# Patient Record
Sex: MALE | Race: BLACK OR AFRICAN AMERICAN | NOT HISPANIC OR LATINO | Employment: PART TIME | URBAN - METROPOLITAN AREA
[De-identification: names, ages, dates, MRNs, and addresses within clinical notes are randomized per-mention and may not be internally consistent; named-entity substitution may affect disease eponyms.]

---

## 2019-12-10 ENCOUNTER — APPOINTMENT (EMERGENCY)
Dept: CT IMAGING | Facility: HOSPITAL | Age: 59
DRG: 299 | End: 2019-12-10
Payer: COMMERCIAL

## 2019-12-10 ENCOUNTER — APPOINTMENT (INPATIENT)
Dept: ULTRASOUND IMAGING | Facility: HOSPITAL | Age: 59
DRG: 299 | End: 2019-12-10
Payer: COMMERCIAL

## 2019-12-10 ENCOUNTER — HOSPITAL ENCOUNTER (INPATIENT)
Facility: HOSPITAL | Age: 59
LOS: 3 days | Discharge: HOME | DRG: 299 | End: 2019-12-13
Attending: EMERGENCY MEDICINE | Admitting: INTERNAL MEDICINE
Payer: COMMERCIAL

## 2019-12-10 DIAGNOSIS — I26.99 OTHER ACUTE PULMONARY EMBOLISM WITHOUT ACUTE COR PULMONALE (HCC): ICD-10-CM

## 2019-12-10 DIAGNOSIS — N17.9 ACUTE KIDNEY INJURY (HCC): ICD-10-CM

## 2019-12-10 DIAGNOSIS — K63.1 PERFORATED SIGMOID COLON (HCC): ICD-10-CM

## 2019-12-10 DIAGNOSIS — I26.99 PULMONARY EMBOLISM (HCC): Primary | ICD-10-CM

## 2019-12-10 DIAGNOSIS — E11.10 DKA (DIABETIC KETOACIDOSES): ICD-10-CM

## 2019-12-10 DIAGNOSIS — E11.10 DIABETIC KETOACIDOSIS WITHOUT COMA ASSOCIATED WITH TYPE 2 DIABETES MELLITUS (HCC): ICD-10-CM

## 2019-12-10 DIAGNOSIS — I82.409 ACUTE DEEP VEIN THROMBOSIS (DVT) OF LOWER EXTREMITY (HCC): ICD-10-CM

## 2019-12-10 PROBLEM — E88.09 HYPOALBUMINEMIA DUE TO PROTEIN-CALORIE MALNUTRITION (HCC): Status: ACTIVE | Noted: 2019-12-10

## 2019-12-10 PROBLEM — E87.2 LACTIC ACIDOSIS: Status: ACTIVE | Noted: 2019-12-10

## 2019-12-10 PROBLEM — R06.89 ACUTE RESPIRATORY INSUFFICIENCY: Status: ACTIVE | Noted: 2019-12-10

## 2019-12-10 PROBLEM — D72.825 BANDEMIA: Status: ACTIVE | Noted: 2019-12-10

## 2019-12-10 PROBLEM — E87.29 INCREASED ANION GAP METABOLIC ACIDOSIS: Status: ACTIVE | Noted: 2019-12-10

## 2019-12-10 PROBLEM — A41.9 SEPSIS (HCC): Status: ACTIVE | Noted: 2019-12-10

## 2019-12-10 PROBLEM — E87.20 LACTIC ACIDOSIS: Status: ACTIVE | Noted: 2019-12-10

## 2019-12-10 PROBLEM — E46 HYPOALBUMINEMIA DUE TO PROTEIN-CALORIE MALNUTRITION (HCC): Status: ACTIVE | Noted: 2019-12-10

## 2019-12-10 PROBLEM — E87.2 INCREASED ANION GAP METABOLIC ACIDOSIS: Status: ACTIVE | Noted: 2019-12-10

## 2019-12-10 LAB
ALBUMIN SERPL BCP-MCNC: 1.9 G/DL (ref 3.5–5)
ALP SERPL-CCNC: 188 U/L (ref 46–116)
ALT SERPL W P-5'-P-CCNC: 20 U/L (ref 12–78)
ANION GAP SERPL CALCULATED.3IONS-SCNC: 24 MMOL/L (ref 4–13)
ANION GAP SERPL CALCULATED.3IONS-SCNC: 26 MMOL/L (ref 4–13)
APTT PPP: 24 SECONDS (ref 23–37)
AST SERPL W P-5'-P-CCNC: 17 U/L (ref 5–45)
BACTERIA UR QL AUTO: ABNORMAL /HPF
BASE EX.OXY STD BLDV CALC-SCNC: 53.4 % (ref 60–80)
BASE EXCESS BLDV CALC-SCNC: -13 MMOL/L
BASOPHILS # BLD MANUAL: 0 THOUSAND/UL (ref 0–0.1)
BASOPHILS NFR MAR MANUAL: 0 % (ref 0–1)
BETA-HYDROXYBUTYRATE: 6.2 MMOL/L
BILIRUB SERPL-MCNC: 0.79 MG/DL (ref 0.2–1)
BILIRUB UR QL STRIP: NEGATIVE
BUN SERPL-MCNC: 19 MG/DL (ref 5–25)
BUN SERPL-MCNC: 21 MG/DL (ref 5–25)
CALCIUM SERPL-MCNC: 8.4 MG/DL (ref 8.3–10.1)
CALCIUM SERPL-MCNC: 9.2 MG/DL (ref 8.3–10.1)
CHLORIDE SERPL-SCNC: 89 MMOL/L (ref 100–108)
CHLORIDE SERPL-SCNC: 97 MMOL/L (ref 100–108)
CLARITY UR: CLEAR
CO2 SERPL-SCNC: 10 MMOL/L (ref 21–32)
CO2 SERPL-SCNC: 12 MMOL/L (ref 21–32)
COLOR UR: YELLOW
CREAT SERPL-MCNC: 1.29 MG/DL (ref 0.6–1.3)
CREAT SERPL-MCNC: 1.44 MG/DL (ref 0.6–1.3)
EOSINOPHIL # BLD MANUAL: 0.12 THOUSAND/UL (ref 0–0.4)
EOSINOPHIL NFR BLD MANUAL: 1 % (ref 0–6)
ERYTHROCYTE [DISTWIDTH] IN BLOOD BY AUTOMATED COUNT: 16.5 % (ref 11.6–15.1)
GFR SERPL CREATININE-BSD FRML MDRD: 62 ML/MIN/1.73SQ M
GFR SERPL CREATININE-BSD FRML MDRD: 71 ML/MIN/1.73SQ M
GLUCOSE SERPL-MCNC: 317 MG/DL (ref 65–140)
GLUCOSE SERPL-MCNC: 429 MG/DL (ref 65–140)
GLUCOSE UR STRIP-MCNC: ABNORMAL MG/DL
HCO3 BLDV-SCNC: 11.9 MMOL/L (ref 24–30)
HCT VFR BLD AUTO: 36.2 % (ref 36.5–49.3)
HGB BLD-MCNC: 11.5 G/DL (ref 12–17)
HGB UR QL STRIP.AUTO: ABNORMAL
INR PPP: 1.29 (ref 0.84–1.19)
KETONES UR STRIP-MCNC: ABNORMAL MG/DL
LACTATE SERPL-SCNC: 2.2 MMOL/L (ref 0.5–2)
LEUKOCYTE ESTERASE UR QL STRIP: NEGATIVE
LYMPHOCYTES # BLD AUTO: 1.16 THOUSAND/UL (ref 0.6–4.47)
LYMPHOCYTES # BLD AUTO: 10 % (ref 14–44)
MAGNESIUM SERPL-MCNC: 2.1 MG/DL (ref 1.6–2.6)
MCH RBC QN AUTO: 25.6 PG (ref 26.8–34.3)
MCHC RBC AUTO-ENTMCNC: 31.8 G/DL (ref 31.4–37.4)
MCV RBC AUTO: 81 FL (ref 82–98)
MONOCYTES # BLD AUTO: 1.04 THOUSAND/UL (ref 0–1.22)
MONOCYTES NFR BLD: 9 % (ref 4–12)
NEUTROPHILS # BLD MANUAL: 9.29 THOUSAND/UL (ref 1.85–7.62)
NEUTS BAND NFR BLD MANUAL: 10 % (ref 0–8)
NEUTS SEG NFR BLD AUTO: 70 % (ref 43–75)
NITRITE UR QL STRIP: NEGATIVE
NON-SQ EPI CELLS URNS QL MICRO: ABNORMAL /HPF
NRBC BLD AUTO-RTO: 0 /100 WBCS
NT-PROBNP SERPL-MCNC: 143 PG/ML
NT-PROBNP SERPL-MCNC: 148 PG/ML
O2 CT BLDV-SCNC: 8.9 ML/DL
PCO2 BLDV: 25.4 MM HG (ref 42–50)
PH BLDV: 7.29 [PH] (ref 7.3–7.4)
PH UR STRIP.AUTO: 5 [PH] (ref 4.5–8)
PHOSPHATE SERPL-MCNC: 2.1 MG/DL (ref 2.7–4.5)
PLATELET # BLD AUTO: 286 THOUSANDS/UL (ref 149–390)
PLATELET BLD QL SMEAR: ADEQUATE
PMV BLD AUTO: 10 FL (ref 8.9–12.7)
PO2 BLDV: 30.1 MM HG (ref 35–45)
POTASSIUM SERPL-SCNC: 4.1 MMOL/L (ref 3.5–5.3)
POTASSIUM SERPL-SCNC: 5 MMOL/L (ref 3.5–5.3)
PROT SERPL-MCNC: 8.3 G/DL (ref 6.4–8.2)
PROT UR STRIP-MCNC: ABNORMAL MG/DL
PROTHROMBIN TIME: 15.4 SECONDS (ref 11.6–14.5)
RBC # BLD AUTO: 4.49 MILLION/UL (ref 3.88–5.62)
RBC #/AREA URNS AUTO: ABNORMAL /HPF
RBC MORPH BLD: NORMAL
SODIUM SERPL-SCNC: 127 MMOL/L (ref 136–145)
SODIUM SERPL-SCNC: 131 MMOL/L (ref 136–145)
SP GR UR STRIP.AUTO: 1.01 (ref 1–1.03)
TOTAL CELLS COUNTED SPEC: 100
TROPONIN I SERPL-MCNC: <0.02 NG/ML
TROPONIN I SERPL-MCNC: <0.02 NG/ML
TSH SERPL DL<=0.05 MIU/L-ACNC: 1.17 UIU/ML (ref 0.36–3.74)
UROBILINOGEN UR QL STRIP.AUTO: 0.2 E.U./DL
WBC # BLD AUTO: 11.61 THOUSAND/UL (ref 4.31–10.16)
WBC #/AREA URNS AUTO: ABNORMAL /HPF

## 2019-12-10 PROCEDURE — 74177 CT ABD & PELVIS W/CONTRAST: CPT

## 2019-12-10 PROCEDURE — 71275 CT ANGIOGRAPHY CHEST: CPT

## 2019-12-10 PROCEDURE — 85027 COMPLETE CBC AUTOMATED: CPT | Performed by: EMERGENCY MEDICINE

## 2019-12-10 PROCEDURE — 99291 CRITICAL CARE FIRST HOUR: CPT | Performed by: PHYSICIAN ASSISTANT

## 2019-12-10 PROCEDURE — 96361 HYDRATE IV INFUSION ADD-ON: CPT

## 2019-12-10 PROCEDURE — 80053 COMPREHEN METABOLIC PANEL: CPT | Performed by: EMERGENCY MEDICINE

## 2019-12-10 PROCEDURE — 93970 EXTREMITY STUDY: CPT

## 2019-12-10 PROCEDURE — 87040 BLOOD CULTURE FOR BACTERIA: CPT | Performed by: EMERGENCY MEDICINE

## 2019-12-10 PROCEDURE — 84484 ASSAY OF TROPONIN QUANT: CPT | Performed by: EMERGENCY MEDICINE

## 2019-12-10 PROCEDURE — 99285 EMERGENCY DEPT VISIT HI MDM: CPT | Performed by: EMERGENCY MEDICINE

## 2019-12-10 PROCEDURE — 85610 PROTHROMBIN TIME: CPT | Performed by: EMERGENCY MEDICINE

## 2019-12-10 PROCEDURE — 84484 ASSAY OF TROPONIN QUANT: CPT | Performed by: PHYSICIAN ASSISTANT

## 2019-12-10 PROCEDURE — 99285 EMERGENCY DEPT VISIT HI MDM: CPT

## 2019-12-10 PROCEDURE — 83880 ASSAY OF NATRIURETIC PEPTIDE: CPT | Performed by: INTERNAL MEDICINE

## 2019-12-10 PROCEDURE — 83605 ASSAY OF LACTIC ACID: CPT | Performed by: EMERGENCY MEDICINE

## 2019-12-10 PROCEDURE — 83735 ASSAY OF MAGNESIUM: CPT | Performed by: PHYSICIAN ASSISTANT

## 2019-12-10 PROCEDURE — 80048 BASIC METABOLIC PNL TOTAL CA: CPT | Performed by: PHYSICIAN ASSISTANT

## 2019-12-10 PROCEDURE — 84443 ASSAY THYROID STIM HORMONE: CPT | Performed by: EMERGENCY MEDICINE

## 2019-12-10 PROCEDURE — 96365 THER/PROPH/DIAG IV INF INIT: CPT

## 2019-12-10 PROCEDURE — 85007 BL SMEAR W/DIFF WBC COUNT: CPT | Performed by: EMERGENCY MEDICINE

## 2019-12-10 PROCEDURE — 82010 KETONE BODYS QUAN: CPT | Performed by: EMERGENCY MEDICINE

## 2019-12-10 PROCEDURE — 84100 ASSAY OF PHOSPHORUS: CPT | Performed by: PHYSICIAN ASSISTANT

## 2019-12-10 PROCEDURE — 83880 ASSAY OF NATRIURETIC PEPTIDE: CPT | Performed by: EMERGENCY MEDICINE

## 2019-12-10 PROCEDURE — 93005 ELECTROCARDIOGRAM TRACING: CPT

## 2019-12-10 PROCEDURE — 81001 URINALYSIS AUTO W/SCOPE: CPT

## 2019-12-10 PROCEDURE — 96374 THER/PROPH/DIAG INJ IV PUSH: CPT

## 2019-12-10 PROCEDURE — 82805 BLOOD GASES W/O2 SATURATION: CPT | Performed by: EMERGENCY MEDICINE

## 2019-12-10 PROCEDURE — 36415 COLL VENOUS BLD VENIPUNCTURE: CPT | Performed by: EMERGENCY MEDICINE

## 2019-12-10 PROCEDURE — 85730 THROMBOPLASTIN TIME PARTIAL: CPT | Performed by: EMERGENCY MEDICINE

## 2019-12-10 PROCEDURE — 82948 REAGENT STRIP/BLOOD GLUCOSE: CPT

## 2019-12-10 RX ORDER — PANTOPRAZOLE SODIUM 40 MG/1
40 TABLET, DELAYED RELEASE ORAL
Status: DISCONTINUED | OUTPATIENT
Start: 2019-12-11 | End: 2019-12-13 | Stop reason: HOSPADM

## 2019-12-10 RX ORDER — DEXTROSE AND SODIUM CHLORIDE 5; .9 G/100ML; G/100ML
250 INJECTION, SOLUTION INTRAVENOUS CONTINUOUS
Status: DISPENSED | OUTPATIENT
Start: 2019-12-10 | End: 2019-12-12

## 2019-12-10 RX ORDER — SODIUM CHLORIDE 9 MG/ML
250 INJECTION, SOLUTION INTRAVENOUS CONTINUOUS
Status: DISCONTINUED | OUTPATIENT
Start: 2019-12-10 | End: 2019-12-10

## 2019-12-10 RX ORDER — SODIUM CHLORIDE 9 MG/ML
500 INJECTION, SOLUTION INTRAVENOUS CONTINUOUS
Status: DISPENSED | OUTPATIENT
Start: 2019-12-11 | End: 2019-12-11

## 2019-12-10 RX ORDER — ATORVASTATIN CALCIUM 10 MG/1
10 TABLET, FILM COATED ORAL DAILY
Status: DISCONTINUED | OUTPATIENT
Start: 2019-12-11 | End: 2019-12-13 | Stop reason: HOSPADM

## 2019-12-10 RX ORDER — ATORVASTATIN CALCIUM 10 MG/1
10 TABLET, FILM COATED ORAL DAILY
COMMUNITY

## 2019-12-10 RX ORDER — CHLORHEXIDINE GLUCONATE 0.12 MG/ML
15 RINSE ORAL EVERY 12 HOURS SCHEDULED
Status: DISCONTINUED | OUTPATIENT
Start: 2019-12-10 | End: 2019-12-11

## 2019-12-10 RX ORDER — HEPARIN SODIUM 10000 [USP'U]/100ML
3-30 INJECTION, SOLUTION INTRAVENOUS
Status: DISCONTINUED | OUTPATIENT
Start: 2019-12-10 | End: 2019-12-13

## 2019-12-10 RX ORDER — HEPARIN SODIUM 1000 [USP'U]/ML
6400 INJECTION, SOLUTION INTRAVENOUS; SUBCUTANEOUS AS NEEDED
Status: DISCONTINUED | OUTPATIENT
Start: 2019-12-10 | End: 2019-12-13

## 2019-12-10 RX ORDER — HEPARIN SODIUM 1000 [USP'U]/ML
3200 INJECTION, SOLUTION INTRAVENOUS; SUBCUTANEOUS AS NEEDED
Status: DISCONTINUED | OUTPATIENT
Start: 2019-12-10 | End: 2019-12-13

## 2019-12-10 RX ORDER — SODIUM CHLORIDE 9 MG/ML
1000 INJECTION, SOLUTION INTRAVENOUS CONTINUOUS
Status: DISPENSED | OUTPATIENT
Start: 2019-12-10 | End: 2019-12-10

## 2019-12-10 RX ORDER — CEFAZOLIN SODIUM 2 G/50ML
2000 SOLUTION INTRAVENOUS EVERY 8 HOURS
Status: DISCONTINUED | OUTPATIENT
Start: 2019-12-11 | End: 2019-12-13 | Stop reason: HOSPADM

## 2019-12-10 RX ORDER — HEPARIN SODIUM 1000 [USP'U]/ML
6400 INJECTION, SOLUTION INTRAVENOUS; SUBCUTANEOUS ONCE
Status: COMPLETED | OUTPATIENT
Start: 2019-12-10 | End: 2019-12-10

## 2019-12-10 RX ORDER — SODIUM CHLORIDE 9 MG/ML
250 INJECTION, SOLUTION INTRAVENOUS CONTINUOUS
Status: DISPENSED | OUTPATIENT
Start: 2019-12-11 | End: 2019-12-11

## 2019-12-10 RX ADMIN — SODIUM CHLORIDE 250 ML/HR: 0.9 INJECTION, SOLUTION INTRAVENOUS at 20:54

## 2019-12-10 RX ADMIN — SODIUM CHLORIDE 1000 ML: 0.9 INJECTION, SOLUTION INTRAVENOUS at 18:51

## 2019-12-10 RX ADMIN — SODIUM CHLORIDE 7.8 UNITS/HR: 9 INJECTION, SOLUTION INTRAVENOUS at 21:54

## 2019-12-10 RX ADMIN — IOHEXOL 100 ML: 350 INJECTION, SOLUTION INTRAVENOUS at 19:30

## 2019-12-10 RX ADMIN — SODIUM CHLORIDE 500 ML/HR: 0.9 INJECTION, SOLUTION INTRAVENOUS at 23:40

## 2019-12-10 RX ADMIN — METRONIDAZOLE 500 MG: 500 INJECTION, SOLUTION INTRAVENOUS at 23:09

## 2019-12-10 RX ADMIN — CEFEPIME HYDROCHLORIDE 2000 MG: 2 INJECTION, POWDER, FOR SOLUTION INTRAVENOUS at 20:50

## 2019-12-10 RX ADMIN — HEPARIN SODIUM 6400 UNITS: 1000 INJECTION, SOLUTION INTRAVENOUS; SUBCUTANEOUS at 21:26

## 2019-12-10 RX ADMIN — HEPARIN SODIUM AND DEXTROSE 18 UNITS/KG/HR: 10000; 5 INJECTION INTRAVENOUS at 21:28

## 2019-12-10 NOTE — ED PROVIDER NOTES
History  Chief Complaint   Patient presents with    Weakness - Generalized     L leg weakness  Pain in L calf  Possible DVT  Denies hx of DVT's       61-year-old male presents emergency department for evaluation of left leg pain and dyspnea on exertion  Patient states that he is approximately 5 weeks status post bowel resection for a cancerous polyp  The surgeries performed at [facility] by Dr Toño Keller  He was admitted to the hospital for 4 days and went home without complication  Patient states approximately 2 weeks ago he noticed that his right leg was painful and swollen  He was evaluated by his PCP who sent him for outpatient lab work  His lab work was abnormal and he was directed to the emergency department for further treatment  He states that he was evaluated at [facility] Emergency Department but discharged home  Since that time he has noticed increased dyspnea on exertion  He is now experiencing left lower leg pain mostly in the calf  He states that he is overall feeling weak  He denies chest pain  He has not had cough he states that his shortness of breath significantly improved when he is resting  He does feel at times that his heart is racing and this makes him feel anxious  He mostly notes this when he is exerting himself  Patient denies history of DVT or PE  Patient is diabetic  When I enter the room I can detect the scented acetone  He states that his insulin regimen was recently decreased by his PCP  He has not been checking his blood sugars regularly  Patient has been having diarrhea  He denies urinary frequency or dysuria  Patient states that he has no abdominal pain  He is right lower quadrant incision is healing well        History provided by:  Patient  History limited by:  Acuity of condition   used: No    Shortness of Breath   Severity:  Severe  Onset quality:  Gradual  Duration:  2 weeks  Timing:  Constant  Progression: Worsening  Chronicity:  New  Context: activity    Context: not URI    Relieved by:  Rest  Worsened by: Activity, exertion and movement  Ineffective treatments:  None tried  Associated symptoms: no abdominal pain, no chest pain, no cough, no diaphoresis, no fever, no hemoptysis, no PND, no rash, no sputum production, no syncope, no vomiting and no wheezing    Risk factors: recent surgery    Risk factors: no hx of PE/DVT        Prior to Admission Medications   Prescriptions Last Dose Informant Patient Reported? Taking? Empagliflozin (JARDIANCE PO) Past Week at Unknown time  Yes Yes   Sig: Take 10 mg by mouth daily   atorvastatin (LIPITOR) 10 mg tablet Past Week at Unknown time  Yes Yes   Sig: Take 10 mg by mouth daily   insulin glargine (TOUJEO MAX) 300 units/mL CONCETRATED U-300 injection pen (2-unit dial) Past Week at Unknown time  Yes Yes   Sig: Inject 10 Units under the skin daily   metFORMIN (GLUCOPHAGE) 1000 MG tablet Past Week at Unknown time  Yes Yes   Sig: Take 1,000 mg by mouth daily      Facility-Administered Medications: None       Past Medical History:   Diagnosis Date    Diabetes mellitus ([facility] [address] )     Hyperlipidemia        Past Surgical History:   Procedure Laterality Date    [facility]    COLONOSCOPY  10/2019    [facility]2019    [facility]    POLYPECTOMY         Family History   Problem Relation Age of Onset    Hypertension Mother     Cancer Father     Alcohol abuse Father     No Known Problems Son     No Known Problems Daughter      I have reviewed and agree with the history as documented  Social History     Tobacco Use    Smoking status: Never Smoker    Smokeless tobacco: Never Used   Substance Use Topics    Alcohol use: Not Currently     Comment: soc    Drug use: Not Currently     Types: "Crack" cocaine, Marijuana        Review of Systems   Constitutional: Negative for diaphoresis and fever  HENT: Negative for trouble swallowing      Respiratory: Positive for shortness of breath  Negative for cough, hemoptysis, sputum production, choking, chest tightness and wheezing  Cardiovascular: Positive for leg swelling  Negative for chest pain, palpitations, syncope and PND  Gastrointestinal: Positive for nausea  Negative for abdominal pain, blood in stool and vomiting  Genitourinary: Negative for dysuria  Skin: Negative for rash  Neurological: Positive for weakness  All other systems reviewed and are negative  Physical Exam  Physical Exam   Constitutional: He is oriented to person, place, and time  Vital signs are normal  He appears well-developed and well-nourished  He appears distressed  HENT:   Head: Normocephalic and atraumatic  Right Ear: External ear normal    Left Ear: External ear normal    Mouth/Throat: Mucous membranes are dry  Eyes: Pupils are equal, round, and reactive to light  Conjunctivae and EOM are normal    Neck: Normal range of motion  Neck supple  No JVD present  Cardiovascular: Regular rhythm, normal heart sounds and intact distal pulses  Tachycardia present  No murmur heard  Pulmonary/Chest: Effort normal and breath sounds normal  No accessory muscle usage  No respiratory distress  He has no wheezes  He has no rhonchi  He has no rales  He exhibits no tenderness  Coarse breath sounds   Abdominal: Soft  Normal appearance and bowel sounds are normal  He exhibits no distension  There is no tenderness  There is no rebound and no guarding  Musculoskeletal: Normal range of motion  He exhibits no edema or deformity  Left lower leg: He exhibits tenderness and swelling  He exhibits no deformity  Legs:  Lymphadenopathy:     He has no cervical adenopathy  Neurological: He is alert and oriented to person, place, and time  He has normal strength and normal reflexes  Coordination normal    Skin: Skin is warm, dry and intact  No rash noted     Psychiatric: His behavior is normal  Judgment and thought content normal  His mood appears anxious  Nursing note and vitals reviewed  Vital Signs  ED Triage Vitals [12/10/19 1719]   Temperature Pulse Respirations Blood Pressure SpO2   97 5 °F (36 4 °C) (!) 132 (!) 24 (!) 160/102 100 %      Temp Source Heart Rate Source Patient Position - Orthostatic VS BP Location FiO2 (%)   Oral Monitor Sitting Right arm --      Pain Score       Worst Possible Pain           Vitals:    12/10/19 1900 12/10/19 2009 12/10/19 2100 12/10/19 2200   BP: 145/88 141/92 150/89 140/78   Pulse: 100 104 (!) 114 104   Patient Position - Orthostatic VS: Lying Lying Lying Lying         Visual Acuity      ED Medications  Medications   sodium chloride 0 9 % infusion (250 mL/hr Intravenous New Bag 12/10/19 2054)   insulin regular (HumuLIN R,NovoLIN R) 1 Units/mL in sodium chloride 0 9 % 100 mL infusion (7 8 Units/hr Intravenous New Bag 12/10/19 2154)   metroNIDAZOLE (FLAGYL) IVPB (premix) 500 mg (has no administration in time range)   heparin (porcine) 25,000 units in 250 mL infusion (premix) (18 Units/kg/hr × 80 kg (Order-Specific) Intravenous New Bag 12/10/19 2128)   heparin (porcine) injection 6,400 Units (has no administration in time range)   heparin (porcine) injection 3,200 Units (has no administration in time range)   metroNIDAZOLE (FLAGYL) IVPB (premix) 500 mg (has no administration in time range)   ceFAZolin (ANCEF) IVPB (premix) 2,000 mg (has no administration in time range)   sodium chloride 0 9 % bolus 1,000 mL (0 mL Intravenous Stopped 12/10/19 2044)   iohexol (OMNIPAQUE) 350 MG/ML injection (MULTI-DOSE) 100 mL (100 mL Intravenous Given 12/10/19 1930)   cefepime (MAXIPIME) 2 g/50 mL dextrose IVPB (2,000 mg Intravenous New Bag 12/10/19 2050)   heparin (porcine) injection 6,400 Units (6,400 Units Intravenous Given 12/10/19 2126)       Diagnostic Studies  Results Reviewed     Procedure Component Value Units Date/Time    Urine Microscopic [[id number]] Collected:  12/10/19 2212    Lab Status:   In process Specimen:  Urine, Clean Catch Updated:  12/10/19 2215    Urine Macroscopic, POC [[id number]]  (Abnormal) Collected:  12/10/19 2212    Lab Status:  Final result Specimen:  Urine Updated:  12/10/19 2212     Color, UA Yellow     Clarity, UA Clear     pH, UA 5 0     Leukocytes, UA Negative     Nitrite, UA Negative     Protein, UA 30 (1+) mg/dl      Glucose,  (1/2%) mg/dl      Ketones, UA >=160 (4+) mg/dl      Urobilinogen, UA 0 2 E U /dl      Bilirubin, UA Negative     Blood, UA Trace     Specific Yulan, UA 1 015    Narrative:       CLINITEK RESULT    NT-BNP PRO [[id number]]  (Abnormal) Collected:  12/10/19 2049    Lab Status:  Final result Specimen:  Blood from Arm, Right Updated:  12/10/19 2125     NT-proBNP 148 pg/mL     APTT [[id number]]  (Normal) Collected:  12/10/19 2047    Lab Status:  Final result Specimen:  Blood from Arm, Right Updated:  12/10/19 2113     PTT 24 seconds     Protime-INR [[id number]]  (Abnormal) Collected:  12/10/19 2047    Lab Status:  Final result Specimen:  Blood from Arm, Right Updated:  12/10/19 2113     Protime 15 4 seconds      INR 1 29    Lactic acid, plasma [[id number]]  (Abnormal) Collected:  12/10/19 2008    Lab Status:  Final result Specimen:  Blood from Arm, Left Updated:  12/10/19 2059     LACTIC ACID 2 2 mmol/L     Narrative:       Result may be elevated if tourniquet was used during collection  NT-BNP PRO [[id number]]  (Abnormal) Collected:  12/10/19 1850    Lab Status:  Final result Specimen:  Blood from Arm, Right Updated:  12/10/19 2056     NT-proBNP 143 pg/mL     Blood culture #2 [[id number]] Collected:  12/10/19 2016    Lab Status: In process Specimen:  Blood from Hand, Right Updated:  12/10/19 2022    Blood culture #1 [[id number]] Collected:  12/10/19 2008    Lab Status:   In process Specimen:  Blood from Arm, Left Updated:  12/10/19 2022    Beta Hydroxybutyrate [[id number]]  (Abnormal) Collected:  12/10/19 1950    Lab Status:  Final result Specimen:  Blood from Arm, Left Updated:  12/10/19 1958     BETA-HYDROXYBUTYRATE 6 2 mmol/L     Blood gas, venous [[id number]]  (Abnormal) Collected:  12/10/19 1950    Lab Status:  Final result Specimen:  Blood from Arm, Left Updated:  12/10/19 1956     pH, Jv 7 290     pCO2, Jv 25 4 mm Hg      pO2, Jv 30 1 mm Hg      HCO3, Jv 11 9 mmol/L      Base Excess, Jv -13 0 mmol/L      O2 Content, Jv 8 9 ml/dL      O2 HGB, VENOUS 53 4 %     TSH [[id number]]  (Normal) Collected:  12/10/19 1850    Lab Status:  Final result Specimen:  Blood from Arm, Right Updated:  12/10/19 1927     TSH 3RD GENERATON 1 171 uIU/mL     Narrative:       Patients undergoing fluorescein dye angiography may retain small amounts of fluorescein in the body for 48-72 hours post procedure  Samples containing fluorescein can produce falsely depressed TSH values  If the patient had this procedure,a specimen should be resubmitted post fluorescein clearance  CBC and differential [[id number]]  (Abnormal) Collected:  12/10/19 1850    Lab Status:  Final result Specimen:  Blood from Arm, Right Updated:  12/10/19 1925     WBC 11 61 Thousand/uL      RBC 4 49 Million/uL      Hemoglobin 11 5 g/dL      Hematocrit 36 2 %      MCV 81 fL      MCH 25 6 pg      MCHC 31 8 g/dL      RDW 16 5 %      MPV 10 0 fL      Platelets 858 Thousands/uL      nRBC 0 /100 WBCs     Narrative: This is an appended report  These results have been appended to a previously verified report      Troponin I [[id number]]  (Normal) Collected:  12/10/19 1850    Lab Status:  Final result Specimen:  Blood from Arm, Right Updated:  12/10/19 1924     Troponin I <0 02 ng/mL     Comprehensive metabolic panel [[id number]]  (Abnormal) Collected:  12/10/19 1850    Lab Status:  Final result Specimen:  Blood from Arm, Right Updated:  12/10/19 1918     Sodium 127 mmol/L      Potassium 5 0 mmol/L      Chloride 89 mmol/L      CO2 12 mmol/L      ANION GAP 26 mmol/L      BUN 21 mg/dL      Creatinine 1 44 mg/dL      Glucose 429 mg/dL      Calcium 9 2 mg/dL      AST 17 U/L      ALT 20 U/L      Alkaline Phosphatase 188 U/L      Total Protein 8 3 g/dL      Albumin 1 9 g/dL      Total Bilirubin 0 79 mg/dL      eGFR 62 ml/min/1 73sq m     Narrative:       [place] guidelines for Chronic Kidney Disease (CKD):     Stage 1 with normal or high GFR (GFR > 90 mL/min/1 73 square meters)    Stage 2 Mild CKD (GFR = 60-89 mL/min/1 73 square meters)    Stage 3A Moderate CKD (GFR = 45-59 mL/min/1 73 square meters)    Stage 3B Moderate CKD (GFR = 30-44 mL/min/1 73 square meters)    Stage 4 Severe CKD (GFR = 15-29 mL/min/1 73 square meters)    Stage 5 End Stage CKD (GFR <15 mL/min/1 73 square meters)  Note: GFR calculation is accurate only with a steady state creatinine                 CTA chest ct abdomen pelvis w contrast   Final Result by Tawny Keith DO (12/10 2006)      Multiple acute pulmonary emboli are identified within the right upper and lower lobe including an embolus within the distal aspect of the right main pulmonary artery just proximal to its bifurcation  The calculated ratio of right ventricular to left ventricular diameter (RV/LV ratio) is 1 65      This is greater than 0 9, which is abnormal and indicates right heart strain  An abnormal RV/LV ratio has been shown to be associated with an increased risk of 30 day mortality in the setting of acute pulmonary embolism  Urgent consultation with the    medical critical care team is recommended  Acute contained perforation of the distal left colon and proximal sigmoid colon within the left paramedian lower abdomen and pelvis where there is a collection of extraluminal stool and free air, just proximal to a previous surgical anastomosis          I personally discussed this study with EDVIN CHRISTENSEN on 12/10/2019 at 8:05 PM                         Workstation performed: [id number]         VAS lower limb venous duplex study, complete bilateral (Results Pending)              Procedures  ECG 12 Lead Documentation Only  Date/Time: 12/10/2019 8:59 PM  Performed by: Beba Soto DO  Authorized by: Beba Soto DO     Indications / Diagnosis:  SOB  ECG reviewed by me, the ED Provider: yes    Patient location:  ED  Previous ECG:     Previous ECG:  Unavailable  Interpretation:     Interpretation: abnormal    Rate:     ECG rate:  105    ECG rate assessment: tachycardic    Rhythm:     Rhythm: sinus tachycardia    Ectopy:     Ectopy: none    QRS:     QRS axis:  Normal  Conduction:     Conduction: normal    ST segments:     ST segments:  Normal  T waves:     T waves: normal      CriticalCare Time  Performed by: Beba Soto DO  Authorized by: Beba Soto DO     Critical care provider statement:     Critical care time (minutes):  35    Critical care time was exclusive of:  Separately billable procedures and treating other patients    Critical care was necessary to treat or prevent imminent or life-threatening deterioration of the following conditions:  Circulatory failure and endocrine crisis    Critical care was time spent personally by me on the following activities:  Blood draw for specimens, obtaining history from patient or surrogate, examination of patient, evaluation of patient's response to treatment, discussions with consultants, development of treatment plan with patient or surrogate, re-evaluation of patient's condition, ordering and review of radiographic studies, ordering and review of laboratory studies and ordering and performing treatments and interventions    I assumed direction of critical care for this patient from another provider in my specialty: no               ED Course  ED Course as of Dec 10 2225   Tue Dec 10, 2019   2015 Patient re-evaluated by me  He is resting comfortably in no acute distress  I informed him that I suspect that he has pulmonary embolism on his CT scan  There is also an abnormal air-fluid collection in the left abdomen    I did re-examine his abdomen he has no rebound or guarding, no tenderness  He has normoactive bowel sounds  Patient also has findings consistent with diabetic ketoacidosis  Plan is to start insulin infusion, patient will likely need heparin drip and surgical consult  Case will be discussed care  Initial Sepsis Screening     Row Name 12/10/19 2056                Is the patient's history suggestive of a new or worsening infection? No  -AW        Suspected source of infection          Are two or more of the following signs & symptoms of infection both present and new to the patient?         Indicate SIRS criteria          If the answer is yes to both questions, suspicion of sepsis is present          If severe sepsis is present AND tissue hypoperfusion perists in the hour after fluid resuscitation or lactate > 4, the patient meets criteria for SEPTIC SHOCK          Are any of the following organ dysfunction criteria present within 6 hours of suspected infection and SIRS criteria that are NOT considered to be chronic conditions?         Organ dysfunction          Date of presentation of severe sepsis          Time of presentation of severe sepsis          Tissue hypoperfusion persists in the hour after crystalloid fluid administration, evidenced, by either:          Was hypotension present within one hour of the conclusion of crystalloid fluid administration?           Date of presentation of septic shock          Time of presentation of septic shock            User Key  (r) = Recorded By, (t) = Taken By, (c) = Cosigned By    [address] Name Provider Type    AW Marta De DO Physician            Evie Ash' Criteria for PE      Most Recent Value   Albert' Criteria for PE   Clinical signs and symptoms of DVT  3 Filed at: 12/10/2019 8992   PE is primary diagnosis or equally likely  3 Filed at: 12/10/2019 1838   HR >100  1 5 Filed at: 12/10/2019 1838   Immobilization at least 3 days or Surgery in the previous 4 weeks  1 5 Filed at: 12/10/2019 1838   Previous, objectively diagnosed PE or DVT  0 Filed at: 12/10/2019 1838   Hemoptysis  0 Filed at: 12/10/2019 1870   Malignancy with treatment within 6 months or palliative  0 Filed at: 12/10/2019 Channing Godoy' Criteria Total  9 Filed at: 12/10/2019 5168            MDM  Number of Diagnoses or Management Options  Acute kidney injury [facility]): new and requires workup  DKA (diabetic ketoacidoses) [facility]): new and requires workup  Perforated sigmoid colon [facility]): new and requires workup  Pulmonary embolism [facility]): new and requires workup     Amount and/or Complexity of Data Reviewed  Clinical lab tests: ordered and reviewed  Tests in the radiology section of CPT®: ordered and reviewed  Tests in the medicine section of CPT®: ordered and reviewed  Decide to obtain previous medical records or to obtain history from someone other than the patient: yes  Discuss the patient with other providers: yes  Independent visualization of images, tracings, or specimens: yes    Patient Progress  Patient progress: stable        Disposition  Final diagnoses:   [facility]   DKA (diabetic ketoacidoses) ([facility] )   Acute kidney injury ([facility] )   Perforated sigmoid colon ([facility] )     Time reflects when diagnosis was documented in both MDM as applicable and the Disposition within this note     Time User Action Codes Description Comment    12/10/2019  8:55 PM Mirlande Lombardo Add [I26 99] Pulmonary embolism ([facility] )     12/10/2019  8:55 PM Mirlande Lombardo Add [E11 10] DKA (diabetic ketoacidoses) ([facility] [address] )     12/10/2019  8:55 PM Mirlande Lombardo Add [N17 9] Acute kidney injury ([facility] [address] )     12/10/2019  9:38 PM Dolly Hassan Add [K63 1] Perforated sigmoid colon ([facility] )     12/10/2019  9:38 PM Dolly Hassan Remove [K63 1] Perforated sigmoid colon ([facility] )     12/10/2019  9:41 PM Dolly Hassan Add [K63 1] Perforated sigmoid colon [facility])       ED Disposition     ED Disposition Condition Date/Time Comment    Admit Stable Tue Dec 10, 2019  8:55 PM Case was discussed with MELIZA Willoughby and Dr Aundrea Avery and the patient's admission status was agreed to be Admission Status: inpatient status to the service of Dr Carolee Sloan   Follow-up Information    None         Patient's Medications   Discharge Prescriptions    No medications on file     No discharge procedures on file      ED Provider  Electronically Signed by           Christiano Chahal DO  12/10/19 5940

## 2019-12-11 ENCOUNTER — APPOINTMENT (INPATIENT)
Dept: NON INVASIVE DIAGNOSTICS | Facility: HOSPITAL | Age: 59
DRG: 299 | End: 2019-12-11
Payer: COMMERCIAL

## 2019-12-11 PROBLEM — E87.2 LACTIC ACIDOSIS: Status: RESOLVED | Noted: 2019-12-10 | Resolved: 2019-12-11

## 2019-12-11 PROBLEM — E87.29 INCREASED ANION GAP METABOLIC ACIDOSIS: Status: RESOLVED | Noted: 2019-12-10 | Resolved: 2019-12-11

## 2019-12-11 PROBLEM — E87.2 INCREASED ANION GAP METABOLIC ACIDOSIS: Status: RESOLVED | Noted: 2019-12-10 | Resolved: 2019-12-11

## 2019-12-11 PROBLEM — E87.20 LACTIC ACIDOSIS: Status: RESOLVED | Noted: 2019-12-10 | Resolved: 2019-12-11

## 2019-12-11 LAB
ANION GAP SERPL CALCULATED.3IONS-SCNC: 12 MMOL/L (ref 4–13)
ANION GAP SERPL CALCULATED.3IONS-SCNC: 9 MMOL/L (ref 4–13)
ANION GAP SERPL CALCULATED.3IONS-SCNC: 9 MMOL/L (ref 4–13)
APTT PPP: 175 SECONDS (ref 23–37)
APTT PPP: 57 SECONDS (ref 23–37)
APTT PPP: 93 SECONDS (ref 23–37)
BASOPHILS # BLD MANUAL: 0.08 THOUSAND/UL (ref 0–0.1)
BASOPHILS NFR MAR MANUAL: 1 % (ref 0–1)
BUN SERPL-MCNC: 11 MG/DL (ref 5–25)
BUN SERPL-MCNC: 14 MG/DL (ref 5–25)
BUN SERPL-MCNC: 8 MG/DL (ref 5–25)
CALCIUM SERPL-MCNC: 7.1 MG/DL (ref 8.3–10.1)
CALCIUM SERPL-MCNC: 7.1 MG/DL (ref 8.3–10.1)
CALCIUM SERPL-MCNC: 7.5 MG/DL (ref 8.3–10.1)
CHLORIDE SERPL-SCNC: 109 MMOL/L (ref 100–108)
CHLORIDE SERPL-SCNC: 109 MMOL/L (ref 100–108)
CHLORIDE SERPL-SCNC: 111 MMOL/L (ref 100–108)
CO2 SERPL-SCNC: 16 MMOL/L (ref 21–32)
CO2 SERPL-SCNC: 19 MMOL/L (ref 21–32)
CO2 SERPL-SCNC: 20 MMOL/L (ref 21–32)
CREAT SERPL-MCNC: 0.83 MG/DL (ref 0.6–1.3)
CREAT SERPL-MCNC: 0.86 MG/DL (ref 0.6–1.3)
CREAT SERPL-MCNC: 0.98 MG/DL (ref 0.6–1.3)
EOSINOPHIL # BLD MANUAL: 0.08 THOUSAND/UL (ref 0–0.4)
EOSINOPHIL NFR BLD MANUAL: 1 % (ref 0–6)
ERYTHROCYTE [DISTWIDTH] IN BLOOD BY AUTOMATED COUNT: 16.3 % (ref 11.6–15.1)
GFR SERPL CREATININE-BSD FRML MDRD: 111 ML/MIN/1.73SQ M
GFR SERPL CREATININE-BSD FRML MDRD: 113 ML/MIN/1.73SQ M
GFR SERPL CREATININE-BSD FRML MDRD: 99 ML/MIN/1.73SQ M
GLUCOSE SERPL-MCNC: 107 MG/DL (ref 65–140)
GLUCOSE SERPL-MCNC: 108 MG/DL (ref 65–140)
GLUCOSE SERPL-MCNC: 116 MG/DL (ref 65–140)
GLUCOSE SERPL-MCNC: 118 MG/DL (ref 65–140)
GLUCOSE SERPL-MCNC: 139 MG/DL (ref 65–140)
GLUCOSE SERPL-MCNC: 144 MG/DL (ref 65–140)
GLUCOSE SERPL-MCNC: 168 MG/DL (ref 65–140)
GLUCOSE SERPL-MCNC: 169 MG/DL (ref 65–140)
GLUCOSE SERPL-MCNC: 193 MG/DL (ref 65–140)
GLUCOSE SERPL-MCNC: 196 MG/DL (ref 65–140)
GLUCOSE SERPL-MCNC: 209 MG/DL (ref 65–140)
GLUCOSE SERPL-MCNC: 219 MG/DL (ref 65–140)
GLUCOSE SERPL-MCNC: 233 MG/DL (ref 65–140)
GLUCOSE SERPL-MCNC: 236 MG/DL (ref 65–140)
GLUCOSE SERPL-MCNC: 237 MG/DL (ref 65–140)
GLUCOSE SERPL-MCNC: 240 MG/DL (ref 65–140)
GLUCOSE SERPL-MCNC: 245 MG/DL (ref 65–140)
GLUCOSE SERPL-MCNC: 252 MG/DL (ref 65–140)
GLUCOSE SERPL-MCNC: 254 MG/DL (ref 65–140)
GLUCOSE SERPL-MCNC: 256 MG/DL (ref 65–140)
GLUCOSE SERPL-MCNC: 263 MG/DL (ref 65–140)
GLUCOSE SERPL-MCNC: 283 MG/DL (ref 65–140)
GLUCOSE SERPL-MCNC: 323 MG/DL (ref 65–140)
GLUCOSE SERPL-MCNC: 91 MG/DL (ref 65–140)
HCT VFR BLD AUTO: 28.2 % (ref 36.5–49.3)
HGB BLD-MCNC: 9.2 G/DL (ref 12–17)
LACTATE SERPL-SCNC: 1.2 MMOL/L (ref 0.5–2)
LYMPHOCYTES # BLD AUTO: 1.47 THOUSAND/UL (ref 0.6–4.47)
LYMPHOCYTES # BLD AUTO: 18 % (ref 14–44)
MAGNESIUM SERPL-MCNC: 1.8 MG/DL (ref 1.6–2.6)
MAGNESIUM SERPL-MCNC: 2.1 MG/DL (ref 1.6–2.6)
MAGNESIUM SERPL-MCNC: 2.1 MG/DL (ref 1.6–2.6)
MCH RBC QN AUTO: 25.3 PG (ref 26.8–34.3)
MCHC RBC AUTO-ENTMCNC: 32.6 G/DL (ref 31.4–37.4)
MCV RBC AUTO: 78 FL (ref 82–98)
MONOCYTES # BLD AUTO: 0.65 THOUSAND/UL (ref 0–1.22)
MONOCYTES NFR BLD: 8 % (ref 4–12)
NEUTROPHILS # BLD MANUAL: 5.78 THOUSAND/UL (ref 1.85–7.62)
NEUTS BAND NFR BLD MANUAL: 10 % (ref 0–8)
NEUTS SEG NFR BLD AUTO: 61 % (ref 43–75)
NRBC BLD AUTO-RTO: 0 /100 WBCS
PHOSPHATE SERPL-MCNC: 1.9 MG/DL (ref 2.7–4.5)
PHOSPHATE SERPL-MCNC: 2.1 MG/DL (ref 2.7–4.5)
PHOSPHATE SERPL-MCNC: 2.2 MG/DL (ref 2.7–4.5)
PLATELET # BLD AUTO: 262 THOUSANDS/UL (ref 149–390)
PLATELET BLD QL SMEAR: ADEQUATE
PMV BLD AUTO: 9.8 FL (ref 8.9–12.7)
POTASSIUM SERPL-SCNC: 3.4 MMOL/L (ref 3.5–5.3)
POTASSIUM SERPL-SCNC: 3.6 MMOL/L (ref 3.5–5.3)
POTASSIUM SERPL-SCNC: 3.7 MMOL/L (ref 3.5–5.3)
RBC # BLD AUTO: 3.64 MILLION/UL (ref 3.88–5.62)
RBC MORPH BLD: NORMAL
SODIUM SERPL-SCNC: 137 MMOL/L (ref 136–145)
SODIUM SERPL-SCNC: 138 MMOL/L (ref 136–145)
SODIUM SERPL-SCNC: 139 MMOL/L (ref 136–145)
TOTAL CELLS COUNTED SPEC: 100
TROPONIN I SERPL-MCNC: 0.02 NG/ML
TROPONIN I SERPL-MCNC: 0.02 NG/ML
VARIANT LYMPHS # BLD AUTO: 1 %
WBC # BLD AUTO: 8.14 THOUSAND/UL (ref 4.31–10.16)

## 2019-12-11 PROCEDURE — 82948 REAGENT STRIP/BLOOD GLUCOSE: CPT

## 2019-12-11 PROCEDURE — 84484 ASSAY OF TROPONIN QUANT: CPT | Performed by: PHYSICIAN ASSISTANT

## 2019-12-11 PROCEDURE — 99233 SBSQ HOSP IP/OBS HIGH 50: CPT | Performed by: INTERNAL MEDICINE

## 2019-12-11 PROCEDURE — 99255 IP/OBS CONSLTJ NEW/EST HI 80: CPT | Performed by: INTERNAL MEDICINE

## 2019-12-11 PROCEDURE — 93306 TTE W/DOPPLER COMPLETE: CPT

## 2019-12-11 PROCEDURE — 80048 BASIC METABOLIC PNL TOTAL CA: CPT | Performed by: PHYSICIAN ASSISTANT

## 2019-12-11 PROCEDURE — 85027 COMPLETE CBC AUTOMATED: CPT | Performed by: INTERNAL MEDICINE

## 2019-12-11 PROCEDURE — 85007 BL SMEAR W/DIFF WBC COUNT: CPT | Performed by: INTERNAL MEDICINE

## 2019-12-11 PROCEDURE — 83605 ASSAY OF LACTIC ACID: CPT | Performed by: PHYSICIAN ASSISTANT

## 2019-12-11 PROCEDURE — 93306 TTE W/DOPPLER COMPLETE: CPT | Performed by: INTERNAL MEDICINE

## 2019-12-11 PROCEDURE — 85730 THROMBOPLASTIN TIME PARTIAL: CPT | Performed by: INTERNAL MEDICINE

## 2019-12-11 PROCEDURE — 93970 EXTREMITY STUDY: CPT | Performed by: SURGERY

## 2019-12-11 PROCEDURE — 85730 THROMBOPLASTIN TIME PARTIAL: CPT | Performed by: PHYSICIAN ASSISTANT

## 2019-12-11 PROCEDURE — 84100 ASSAY OF PHOSPHORUS: CPT | Performed by: PHYSICIAN ASSISTANT

## 2019-12-11 PROCEDURE — 83735 ASSAY OF MAGNESIUM: CPT | Performed by: PHYSICIAN ASSISTANT

## 2019-12-11 RX ORDER — POTASSIUM CHLORIDE 20 MEQ/1
20 TABLET, EXTENDED RELEASE ORAL ONCE
Status: COMPLETED | OUTPATIENT
Start: 2019-12-11 | End: 2019-12-11

## 2019-12-11 RX ORDER — SODIUM CHLORIDE, SODIUM GLUCONATE, SODIUM ACETATE, POTASSIUM CHLORIDE, MAGNESIUM CHLORIDE, SODIUM PHOSPHATE, DIBASIC, AND POTASSIUM PHOSPHATE .53; .5; .37; .037; .03; .012; .00082 G/100ML; G/100ML; G/100ML; G/100ML; G/100ML; G/100ML; G/100ML
100 INJECTION, SOLUTION INTRAVENOUS CONTINUOUS
Status: DISCONTINUED | OUTPATIENT
Start: 2019-12-12 | End: 2019-12-12

## 2019-12-11 RX ORDER — POTASSIUM CHLORIDE 20 MEQ/1
40 TABLET, EXTENDED RELEASE ORAL ONCE
Status: COMPLETED | OUTPATIENT
Start: 2019-12-11 | End: 2019-12-11

## 2019-12-11 RX ORDER — POTASSIUM CHLORIDE 14.9 MG/ML
20 INJECTION INTRAVENOUS ONCE
Status: COMPLETED | OUTPATIENT
Start: 2019-12-11 | End: 2019-12-11

## 2019-12-11 RX ORDER — MAGNESIUM SULFATE HEPTAHYDRATE 40 MG/ML
2 INJECTION, SOLUTION INTRAVENOUS ONCE
Status: COMPLETED | OUTPATIENT
Start: 2019-12-11 | End: 2019-12-11

## 2019-12-11 RX ORDER — INSULIN GLARGINE 100 [IU]/ML
30 INJECTION, SOLUTION SUBCUTANEOUS
Status: DISCONTINUED | OUTPATIENT
Start: 2019-12-11 | End: 2019-12-12

## 2019-12-11 RX ADMIN — METRONIDAZOLE 500 MG: 500 INJECTION, SOLUTION INTRAVENOUS at 10:47

## 2019-12-11 RX ADMIN — ATORVASTATIN CALCIUM 10 MG: 10 TABLET, FILM COATED ORAL at 09:59

## 2019-12-11 RX ADMIN — HEPARIN SODIUM 3200 UNITS: 1000 INJECTION INTRAVENOUS; SUBCUTANEOUS at 20:34

## 2019-12-11 RX ADMIN — POTASSIUM CHLORIDE 40 MEQ: 1500 TABLET, EXTENDED RELEASE ORAL at 10:44

## 2019-12-11 RX ADMIN — HEPARIN SODIUM AND DEXTROSE 13 UNITS/KG/HR: 10000; 5 INJECTION INTRAVENOUS at 18:18

## 2019-12-11 RX ADMIN — DEXTROSE AND SODIUM CHLORIDE 250 ML/HR: 5; .9 INJECTION, SOLUTION INTRAVENOUS at 00:18

## 2019-12-11 RX ADMIN — POTASSIUM CHLORIDE 40 MEQ: 1500 TABLET, EXTENDED RELEASE ORAL at 16:48

## 2019-12-11 RX ADMIN — PANTOPRAZOLE SODIUM 40 MG: 40 TABLET, DELAYED RELEASE ORAL at 05:55

## 2019-12-11 RX ADMIN — POTASSIUM PHOSPHATE, MONOBASIC AND POTASSIUM PHOSPHATE, DIBASIC 30 MMOL: 224; 236 INJECTION, SOLUTION, CONCENTRATE INTRAVENOUS at 06:28

## 2019-12-11 RX ADMIN — SODIUM CHLORIDE 8 UNITS/HR: 9 INJECTION, SOLUTION INTRAVENOUS at 13:21

## 2019-12-11 RX ADMIN — CEFAZOLIN SODIUM 2000 MG: 2 SOLUTION INTRAVENOUS at 18:09

## 2019-12-11 RX ADMIN — SODIUM PHOSPHATE, MONOBASIC, MONOHYDRATE 12 MMOL: 276; 142 INJECTION, SOLUTION INTRAVENOUS at 16:48

## 2019-12-11 RX ADMIN — INSULIN GLARGINE 30 UNITS: 100 INJECTION, SOLUTION SUBCUTANEOUS at 22:01

## 2019-12-11 RX ADMIN — MAGNESIUM SULFATE HEPTAHYDRATE 2 G: 40 INJECTION, SOLUTION INTRAVENOUS at 05:55

## 2019-12-11 RX ADMIN — DEXTROSE AND SODIUM CHLORIDE 250 ML/HR: 5; .9 INJECTION, SOLUTION INTRAVENOUS at 06:38

## 2019-12-11 RX ADMIN — POTASSIUM CHLORIDE 20 MEQ: 1500 TABLET, EXTENDED RELEASE ORAL at 05:55

## 2019-12-11 RX ADMIN — CEFAZOLIN SODIUM 2000 MG: 2 SOLUTION INTRAVENOUS at 04:40

## 2019-12-11 RX ADMIN — CALCIUM GLUCONATE 1 G: 98 INJECTION, SOLUTION INTRAVENOUS at 06:27

## 2019-12-11 RX ADMIN — DEXTROSE AND SODIUM CHLORIDE 250 ML/HR: 5; .9 INJECTION, SOLUTION INTRAVENOUS at 15:13

## 2019-12-11 RX ADMIN — SODIUM CHLORIDE 250 ML/HR: 0.9 INJECTION, SOLUTION INTRAVENOUS at 04:13

## 2019-12-11 RX ADMIN — CHLORHEXIDINE GLUCONATE 0.12% ORAL RINSE 15 ML: 1.2 LIQUID ORAL at 03:48

## 2019-12-11 RX ADMIN — DEXTROSE AND SODIUM CHLORIDE 250 ML/HR: 5; .9 INJECTION, SOLUTION INTRAVENOUS at 10:42

## 2019-12-11 RX ADMIN — CEFAZOLIN SODIUM 2000 MG: 2 SOLUTION INTRAVENOUS at 09:59

## 2019-12-11 RX ADMIN — METRONIDAZOLE 500 MG: 500 INJECTION, SOLUTION INTRAVENOUS at 03:47

## 2019-12-11 RX ADMIN — SODIUM PHOSPHATE, MONOBASIC, MONOHYDRATE 12 MMOL: 276; 142 INJECTION, SOLUTION INTRAVENOUS at 01:22

## 2019-12-11 RX ADMIN — SODIUM CHLORIDE 1000 ML: 0.9 INJECTION, SOLUTION INTRAVENOUS at 00:37

## 2019-12-11 RX ADMIN — POTASSIUM CHLORIDE 20 MEQ: 14.9 INJECTION, SOLUTION INTRAVENOUS at 00:44

## 2019-12-11 RX ADMIN — DEXTROSE AND SODIUM CHLORIDE 250 ML/HR: 5; .9 INJECTION, SOLUTION INTRAVENOUS at 19:26

## 2019-12-11 RX ADMIN — METRONIDAZOLE 500 MG: 500 INJECTION, SOLUTION INTRAVENOUS at 19:58

## 2019-12-11 RX ADMIN — CHLORHEXIDINE GLUCONATE 0.12% ORAL RINSE 15 ML: 1.2 LIQUID ORAL at 09:59

## 2019-12-11 RX ADMIN — SODIUM CHLORIDE 250 ML/HR: 0.9 INJECTION, SOLUTION INTRAVENOUS at 02:41

## 2019-12-11 NOTE — ED NOTES
Pt brought back to room in wheelchair  Pt became tachycardiac and tachypnic while attempting to change into gown  Denies CP  Pt states he had cancerous colon polyp removed along with colon resection on 11/20  Concerned about DVT       Migdalia Sabillon RN  12/10/19 3266

## 2019-12-11 NOTE — CONSULTS
Consultation -General Surgery  Dominique Andre 64 y o  male MRN: [id number]  Unit/Bed#: ED 15 Encounter: [id number]        Consults    ASSESSMENT/PLAN:  Pericolonic collection s/p recent colon resection  Pulmonary embolus   Probable LLE DVT  DKA  DM  ALIA  Hyponatremia    65 yo M with acute PE, DKA, ALIA and s/p recent colon resection  Abdominal exam is negative  Will start IV antibiotics for now and Dr Lux Province to evaluate further tomorrow  No need for surgical intervention at this time  · Ancef/Flagyl  · IVF  · Diet as tolerated   · Care per CC team      Reason for Consult / Principal Problem: abdominal collection    HPI: Dominique Andre is a 64y o  year old male with history of DM, HLD, s/p colon resection at [facility] 11/13/19  presents to the ED complaining of weakness, shortness of breath on exertion and being light headed for the past 2 weeks  Associated symptoms are anorexia, chills, left leg swelling, nausea and loose stools  He has lost about 20 pounds over the last month  He denies abdominal pain, fever, chills  Cat scan shows multiple bilateral pulmonary emboli with right heart strain, and and acute contained colon perforation just proximal to surgical anastamosis  Review of Systems   Constitutional: Positive for activity change, appetite change (loss of weight), chills, diaphoresis, fatigue and unexpected weight change (20 pound alex loss since 11/13/19)  Negative for fever  HENT: Negative  Respiratory: Positive for shortness of breath (with exertion)  Negative for cough and wheezing  Cardiovascular: Positive for leg swelling (left for 8-10 days)  Negative for chest pain and palpitations  Gastrointestinal: Positive for diarrhea (loose stool since surgery) and nausea  Negative for constipation and vomiting  Endocrine: Negative  Genitourinary: Negative  Musculoskeletal: Positive for gait problem  Skin: Positive for wound (incisions on abdomen)     Allergic/Immunologic: Negative  Neurological: Positive for weakness and light-headedness  Hematological: Negative  Psychiatric/Behavioral: Negative  Historical Information   Past Medical History:   Diagnosis Date    Diabetes mellitus ([address] )     Hyperlipidemia      Past Surgical History:   Procedure Laterality Date    [facility]    COLONOSCOPY  10/2019    [facility]ERN[facility] REPAIR Left 04/2019    [facility]    POLYPECTOMY       Social History   Social History     Substance and Sexual Activity   Alcohol Use Not Currently    Comment: soc     Social History     Substance and Sexual Activity   Drug Use Not Currently    Types: "Crack" cocaine, Marijuana     Social History     Tobacco Use   Smoking Status Never Smoker   Smokeless Tobacco Never Used     Family History   Problem Relation Age of Onset    Hypertension Mother     Cancer Father     Alcohol abuse Father     No Known Problems Son     No Known Problems Daughter        Meds/Allergies     Home medications:  Jardiance  Atorvastatin  Toujeo  Metformin    Current Facility-Administered Medications   Medication Dose Route Frequency    cefepime (MAXIPIME) 2 g/50 mL dextrose IVPB  2,000 mg Intravenous Once    heparin (porcine) 25,000 units in 250 mL infusion (premix)  3-30 Units/kg/hr (Order-Specific) Intravenous Titrated    heparin (porcine) injection 3,200 Units  3,200 Units Intravenous PRN    heparin (porcine) injection 6,400 Units  6,400 Units Intravenous Once    heparin (porcine) injection 6,400 Units  6,400 Units Intravenous PRN    insulin regular (HumuLIN R,NovoLIN R) 1 Units/mL in sodium chloride 0 9 % 100 mL infusion  0 1-30 Units/hr Intravenous Continuous    metroNIDAZOLE (FLAGYL) IVPB (premix) 500 mg  500 mg Intravenous Once    sodium chloride 0 9 % infusion  250 mL/hr Intravenous Continuous       No Known Allergies    Objective     Blood pressure 150/89, pulse (!) 114, temperature 97 5 °F (36 4 °C), temperature source Oral, resp   rate 18, weight 78 kg (172 lb), SpO2 100 %  Intake/Output Summary (Last 24 hours) at 12/10/2019 2107  Last data filed at 12/10/2019 2044  Gross per 24 hour   Intake 1000 ml   Output    Net 1000 ml       PHYSICAL EXAM  General appearance: alert and oriented, in no acute distress  Skin: Skin color, texture, turgor normal  No rashes or lesions  Head: Normocephalic, without obvious abnormality  Heart: regular rate and rhythm, S1, S2 normal, no murmur, click, rub or gallop  Lungs: clear to auscultation bilaterally  Abdomen: soft, non-tender; bowel sounds normal; no masses,  no organomegaly   BLE: +1 edema LLE, calf tender and L leg warm to touch   NVI bilaterally, trace pretibial edema RLE  Neurological: normal without focal findings    Lab Results:   Admission on 12/10/2019   Component Date Value    WBC 12/10/2019 11 61*    RBC 12/10/2019 4 49     Hemoglobin 12/10/2019 11 5*    Hematocrit 12/10/2019 36 2*    MCV 12/10/2019 81*    MCH 12/10/2019 25 6*    MCHC 12/10/2019 31 8     RDW 12/10/2019 16 5*    MPV 12/10/2019 10 0     Platelets 61/21/7265 286     nRBC 12/10/2019 0     Sodium 12/10/2019 127*    Potassium 12/10/2019 5 0     Chloride 12/10/2019 89*    CO2 12/10/2019 12*    ANION GAP 12/10/2019 26*    BUN 12/10/2019 21     Creatinine 12/10/2019 1 44*    Glucose 12/10/2019 429*    Calcium 12/10/2019 9 2     AST 12/10/2019 17     ALT 12/10/2019 20     Alkaline Phosphatase 12/10/2019 188*    Total Protein 12/10/2019 8 3*    Albumin 12/10/2019 1 9*    Total Bilirubin 12/10/2019 0 79     eGFR 12/10/2019 62     Troponin I 12/10/2019 <0 02     TSH 3RD GENERATON 12/10/2019 1 171     Segmented % 12/10/2019 70     Bands % 12/10/2019 10*    Lymphocytes % 12/10/2019 10*    Monocytes % 12/10/2019 9     Eosinophils, % 12/10/2019 1     Basophils % 12/10/2019 0     Absolute Neutrophils 12/10/2019 9 29*    Lymphocytes Absolute 12/10/2019 1 16     Monocytes Absolute 12/10/2019 1 04     Eosinophils Absolute 12/10/2019 0 12     Basophils Absolute 12/10/2019 0 00     Total Counted 12/10/2019 100     RBC Morphology 12/10/2019 Normal     Platelet Estimate 61/90/5706 Adequate     BETA-HYDROXYBUTYRATE 12/10/2019 6 2*    pH, Jv 12/10/2019 7 290*    pCO2, Jv 12/10/2019 25 4*    pO2, Jv 12/10/2019 30 1*    HCO3, Jv 12/10/2019 11 9*    Base Excess, Jv 12/10/2019 -13 0     O2 Content, Jv 12/10/2019 8 9     O2 HGB, VENOUS 12/10/2019 53 4*    LACTIC ACID 12/10/2019 2 2*    NT-proBNP 12/10/2019 143*     Imaging Studies: I have personally reviewed pertinent reports  Counseling / Coordination of Care  Total time spent today  30 minutes  Greater than 50% of total time was spent with the patient and / or family counseling and / or coordination of care

## 2019-12-11 NOTE — UTILIZATION REVIEW
Initial Clinical Review    Admission: Date/Time/Statement: Inpatient Admission Orders (From admission, onward)     Ordered        12/10/19 2126  Inpatient Admission  Once                   Orders Placed This Encounter   Procedures    Inpatient Admission     Standing Status:   Standing     Number of Occurrences:   1     Order Specific Question:   Admitting Physician     Answer:   Hira Alford     Order Specific Question:   Level of Care     Answer:   Critical Care [15]     Order Specific Question:   Estimated length of stay     Answer:   More than 2 Midnights     Order Specific Question:   Certification     Answer:   I certify that inpatient services are medically necessary for this patient for a duration of greater than two midnights  See H&P and MD Progress Notes for additional information about the patient's course of treatment  ED Arrival Information     Expected Arrival Acuity Means of Arrival Escorted By Service Admission Type    - 12/10/2019 17:00 Emergent Wheelchair Self Critical Care/ICU Urgent    Arrival Complaint    Weakness        Chief Complaint   Patient presents with    Weakness - Generalized     L leg weakness  Pain in L calf  Possible DVT  Denies hx of DVT's  Assessment/Plan:  65 yo male to ED from home admitted as Inpatient due to Acute Pulmonary embolism with acute respiratory insufficiency and DKA  Patient s/p Colon resection 11/13/2019 at [facility] 2 weeks prior with progressively worse weakness, and significant shortness of breath on exertion, lightheadedness with left leg swelling & pain & weight loss  Follow up visit demonstrates elevated WBC & decreased platelets with sinus tachycardia  MD exam: tachycardia, small incision on right lower abdomen, left extremity swelling & firmness  Imaging reveals multiple acute pulmonary emboli in multiple lobes  In patient labs with elevated pro-BNP, metabolic acidosis, leukocytosis, and Hyperglycemia    IN ED: IV fluids, IV antibiotics, IV anticoagulation begun  Obtain venous duplexes, trend TROPs, obtain ECHO  Cont supplemental O2 for saturation >90%  NPO  Cont IV fluids & follow electrolytes, renal function  Consult surgery, consult endocrinology  Follow blood cultures, cont IV antibiotics, IV anticoagulation  12/11 Surgery-general:  Now found to have acute contained perforation of the distal left colon and proximal sigmoid colon on 12/11 CT  Plan:  1   Acute contained perforation of the distal left colon and proximal sigmoid colon  - mild leukocytosis at 11 61 12/10, continue to monitor  - afebrile, VSS, denies fevers/chills  - lactic acid 1 2 12/10  - continue IV ancef/flagyl  - recommend c/s IR for possible drainage, will discuss with attending  - serial abd exams  - NPO     - DVT ppx  - PRN pain control and antiemetic regimen  - IV fluids  - IV abx  - Encourage ambulation  - Encourage deep breathing and IS    ED Triage Vitals [12/10/19 1719]   Temperature Pulse Respirations Blood Pressure SpO2   97 5 °F (36 4 °C) (!) 132 (!) 24 (!) 160/102 100 %      Temp Source Heart Rate Source Patient Position - Orthostatic VS BP Location FiO2 (%)   Oral Monitor Sitting Right arm --      Pain Score       Worst Possible Pain        Wt Readings from Last 1 Encounters:   12/11/19 73 6 kg (162 lb 4 1 oz)     Additional Vital Signs:   Date/Time  Temp  Pulse  Resp  BP  MAP (mmHg)  SpO2  O2 Device  Patient Position - Orthostatic VS   12/11/19 1200    89  18  112/65  83  95 %       12/11/19 1047  97 6 °F (36 4 °C)      110/63  80         12/11/19 0656  98 2 °F (36 8 °C)  93  15  123/63  86  97 %       12/11/19 0300  98 °F (36 7 °C)  90  16  114/64  82  99 %  Nasal cannula  Lying   12/11/19 0000    112Abnormal   20  113/71  85  100 %       12/10/19 2300    118Abnormal   22      100 %       12/10/19 2249  98 4 °F (36 9 °C)  114Abnormal   20  134/78  99  100 %  Nasal cannula     12/10/19 2200    104  20  140/78    100 %  Nasal cannula  Lying   12/10/19 2100    114Abnormal     150/89    100 %  Nasal cannula  Lying   12/10/19 2009    104  18  141/92    100 %  Nasal cannula  Lying   12/10/19 1900    100    145/88    100 %  None (Room air)  Lying   12/10/19 1819    125Abnormal   24Abnormal   160/99    100 %  None (Room air)  Sitting   12/10/19 1818              None (Room air)     12/10/19 1719  97 5 °F (36 4 °C)  132Abnormal   24Abnormal   160/102Abnormal     100 %  None (Room air)  Sitting      Weights (last 14 days)     Date/Time  Weight  Weight Method  Height   12/11/19 0915      5' 5" (1 651 m)   12/11/19 0539  73 6 kg (162 lb 4 1 oz)  Bed scale     12/10/19 2249  73 6 kg (162 lb 4 1 oz)  Bed scale  5' 5" (1 651 m)   12/10/19 2000  78 kg (172 lb)  Stated         Pertinent Labs/Diagnostic Test Results:   Results from last 7 days   Lab Units 12/11/19  0938 12/10/19  1850   WBC Thousand/uL 8 14 11 61*   HEMOGLOBIN g/dL 9 2* 11 5*   HEMATOCRIT % 28 2* 36 2*   PLATELETS Thousands/uL 262 286   BANDS PCT % 10* 10*         Results from last 7 days   Lab Units 12/11/19  0938 12/11/19  0450 12/10/19  2258 12/10/19  1850   SODIUM mmol/L 138 137 131* 127*   POTASSIUM mmol/L 3 7 3 4* 4 1 5 0   CHLORIDE mmol/L 109* 109* 97* 89*   CO2 mmol/L 20* 16* 10* 12*   ANION GAP mmol/L 9 12 24* 26*   BUN mg/dL 11 14 19 21   CREATININE mg/dL 0 83 0 98 1 29 1 44*   EGFR ml/min/1 73sq m 113 99 71 62   CALCIUM mg/dL 7 1* 7 5* 8 4 9 2   MAGNESIUM mg/dL 2 1 1 8 2 1  --    PHOSPHORUS mg/dL 2 1* 1 9* 2 1*  --      Results from last 7 days   Lab Units 12/10/19  1850   AST U/L 17   ALT U/L 20   ALK PHOS U/L 188*   TOTAL PROTEIN g/dL 8 3*   ALBUMIN g/dL 1 9*   TOTAL BILIRUBIN mg/dL 0 79         Results from last 7 days   Lab Units 12/11/19  0938 12/11/19  0450 12/10/19  2258 12/10/19  1850   GLUCOSE RANDOM mg/dL 196* 209* 317* 429*             BETA-HYDROXYBUTYRATE   Date Value Ref Range Status   12/10/2019 6 2 (H) <0 6 mmol/L Final          Results from last 7 days   Lab Units 12/10/19  1950   PH HONORIO  7 290*   PCO2 HONORIO mm Hg 25 4*   PO2 HONORIO mm Hg 30 1*   HCO3 HONORIO mmol/L 11 9*   BASE EXC HONORIO mmol/L -13 0   O2 CONTENT HONORIO ml/dL 8 9   O2 HGB, VENOUS % 53 4*             Results from last 7 days   Lab Units 12/11/19  0450 12/11/19  0137 12/10/19  2258 12/10/19  1850   TROPONIN I ng/mL 0 02 0 02 <0 02 <0 02         Results from last 7 days   Lab Units 12/11/19 0450 12/10/19  2047   PROTIME seconds  --  15 4*   INR   --  1 29*   PTT seconds 175* 24     Results from last 7 days   Lab Units 12/10/19  1850   TSH 3RD GENERATON uIU/mL 1 171         Results from last 7 days   Lab Units 12/11/19  0450 12/10/19  2008   LACTIC ACID mmol/L 1 2 2 2*             Results from last 7 days   Lab Units 12/10/19  2049 12/10/19  1850   NT-PRO BNP pg/mL 148* 143*       Results from last 7 days   Lab Units 12/10/19  2212   CLARITY UA  Clear   COLOR UA  Yellow   SPEC GRAV UA  1 015   PH UA  5 0   GLUCOSE UA mg/dl 500 (1/2%)*   KETONES UA mg/dl >=160 (4+)*   BLOOD UA  Trace*   PROTEIN UA mg/dl 30 (1+)*   NITRITE UA  Negative   BILIRUBIN UA  Negative   UROBILINOGEN UA E U /dl 0 2   LEUKOCYTES UA  Negative   WBC UA /hpf None Seen   RBC UA /hpf 0-1*   BACTERIA UA /hpf Occasional   EPITHELIAL CELLS WET PREP /hpf None Seen       Results from last 7 days   Lab Units 12/10/19  2016 12/10/19  2008   BLOOD CULTURE  Received in Microbiology Lab  Culture in Progress  Received in Microbiology Lab  Culture in Progress       Results from last 7 days   Lab Units 12/11/19  0938 12/10/19  1850   TOTAL COUNTED  100 100       12/10/2019 CT chest abdomen/pelvis w contrast:  Multiple acute pulmonary emboli are identified within the right upper and lower lobe including an embolus within the distal aspect of the right main pulmonary artery just proximal to its bifurcation  The calculated ratio of right ventricular to left ventricular diameter (RV/LV ratio) is 1 65  This is greater than 0 9, which is abnormal and indicates right heart strain  An abnormal RV/LV ratio has been shown to be associated with an increased risk of 30 day mortality in the setting of acute pulmonary embolism   Urgent consultation with the   medical critical care team is recommended  Acute contained perforation of the distal left colon and proximal sigmoid colon within the left paramedian lower abdomen and pelvis where there is a collection of extraluminal stool and free air, just proximal to a previous surgical anastomosis  12/10 VAS lower limb bilateral:  Impression:  RIGHT LOWER LIMB:  There is acute occlusive deep vein thrombosis noted in the paired posterior  tibial veins, peroneal veins, and soleal vein  No evidence of superficial thrombophlebitis noted  Doppler evaluation shows a normal response to augmentation maneuvers  Popliteal, posterior tibial and anterior tibial arterial Doppler waveforms are  triphasic  LEFT LOWER LIMB:  There is acute occlusive deep vein thrombosis noted in the external iliac vein,  common femoral vein, deep femoral vein, proximal to distal femoral vein  popliteal and gastrocnemius veins, paired posterior tibial and peroneal veins,  and soleal veins  No evidence of superficial thrombophlebitis noted  Doppler evaluation shows a normal response to augmentation maneuvers  Popliteal, posterior tibial and anterior tibial arterial Doppler waveforms are  triphasic    12/10 EKG:   Interpretation:     Interpretation: abnormal    Rate:     ECG rate:  105    ECG rate assessment: tachycardic    Rhythm:     Rhythm: sinus tachycardia    Ectopy:     Ectopy: none    QRS:     QRS axis:  Normal  Conduction:     Conduction: normal    ST segments:     ST segments:  Normal  T waves:     T waves: normal    ED Treatment:   Medication Administration from 12/10/2019 1658 to 12/10/2019 2244       Date/Time Order Dose Route Action     12/10/2019 1851 sodium chloride 0 9 % bolus 1,000 mL 1,000 mL Intravenous New Bag     12/10/2019 2054 sodium chloride 0 9 % infusion 250 mL/hr Intravenous New Bag     12/10/2019 2154 insulin regular (HumuLIN R,NovoLIN R) 1 Units/mL in sodium chloride 0 9 % 100 mL infusion 7 8 Units/hr Intravenous New Bag     12/10/2019 2050 cefepime (MAXIPIME) 2 g/50 mL dextrose IVPB 2,000 mg Intravenous New Bag     12/10/2019 2126 heparin (porcine) injection 6,400 Units 6,400 Units Intravenous Given     12/10/2019 2128 heparin (porcine) 25,000 units in 250 mL infusion (premix) 18 Units/kg/hr Intravenous New Bag        Past Medical History:   Diagnosis Date    Diabetes mellitus ([facility] )     Hyperlipidemia     Renal disorder      Present on Admission:   Diabetic ketoacidosis without coma associated with type 2 diabetes mellitus ([facility] )   ALIA (acute kidney injury) ([place] [address] )   Increased anion gap metabolic acidosis   Hypoalbuminemia due to protein-calorie malnutrition (HCC)   Perforated sigmoid colon (HCC)   Acute pulmonary embolism without acute cor pulmonale ([facility])   Acute respiratory insufficiency    Admitting Diagnosis: DKA (diabetic ketoacidoses) (HCC) [E11 10]  Pulmonary embolism (HCC) [I26 99]  Weakness [R53 1]  Acute kidney injury ([facility] ) [N17 9]  Perforated sigmoid colon ([facility] ) [K63 1]  Age/Sex: 64 y o  male  Admission Orders:  IP CONSULT TO ACUTE CARE SURGERY  IP CONSULT TO ENDOCRINOLOGY  IP CONSULT TO CASE MANAGEMENT  telemetry  Daily wt  I&O  Neuro checks  echo  Nursing dysphagia assess  scd    Scheduled Medications:    Medications:  atorvastatin 10 mg Oral Daily   cefazolin 2,000 mg Intravenous Q8H   chlorhexidine 15 mL Swish [employer] Spit Q12H [address]   metroNIDAZOLE 500 mg Intravenous Q8H   pantoprazole 40 mg Oral Early Morning   potassium phosphate 30 mmol Intravenous Once     Continuous IV Infusions:    dextrose 5 % and sodium chloride 0 9 % 250 mL/hr Intravenous Continuous   heparin (porcine) 3-30 Units/kg/hr (Order-Specific) Intravenous Titrated   insulin regular (HumuLIN R,NovoLIN R) infusion 0 1-30 Units/hr Intravenous Continuous     PRN Meds:    heparin (porcine) 3,200 Units Intravenous PRN   heparin (porcine) 6,400 Units Intravenous PRN     Network Utilization Review Department  [email or web address]  ATTENTION: Please call with any questions or concerns to [phone] and carefully listen to the prompts so that you are directed to the right person  All voicemails are confidential   Milana Villegas all requests for admission clinical reviews, approved or denied determinations and any other requests to dedicated fax number below belonging to the campus where the patient is receiving treatment   List of dedicated fax numbers for the Facilities:  [address] DENIALS (Administrative/Medical Necessity) [phone]   [address] (Maternity/NICU/Pediatrics) [phone] 5400 [facility] [phone]   [facility] [phone]   Tab Lucero [phone]   [facility] 1525  [phone]   Олег Farrar [phone]   [place] [address] 1000 [facility] [phone]

## 2019-12-11 NOTE — PROGRESS NOTES
Progress Note - General Surgery   Shiva Gomes 64 y o  male MRN: [id number]  Unit/Bed#: ICU 04 Encounter: [id number]    Assessment:  28-year-old male with acute PE, DKA, ALIA and status post recent colon resection for a cancerous polyp on 11/13 by Dr David Chavez  Now found to have acute contained perforation of the distal left colon and proximal sigmoid colon on 12/11 CT  Plan:  1  Acute contained perforation of the distal left colon and proximal sigmoid colon  - mild leukocytosis at 11 61 12/10, continue to monitor  - afebrile, VSS, denies fevers/chills  - lactic acid 1 2 12/10  - continue IV ancef/flagyl  - recommend c/s IR for possible drainage, will discuss with attending  - serial abd exams  - NPO    - DVT ppx  - PRN pain control and antiemetic regimen  - IV fluids  - IV abx  - Encourage ambulation  - Encourage deep breathing and IS      Subjective/Objective   Chief Complaint: no acute complaints this am    Subjective:  Patient is a 28-year-old male with acute PE, DKA, ALIA and status post recent colon resection for a cancerous polyp on 11/13 by Dr David Chavez  Now found to have acute contained perforation of the distal left colon and proximal sigmoid colon on 12/11 CT  Patient interviewed lying in room, offers no acute complaints  States he is feeling much better than yesterday  Per patient, last BM was yesterday, states it was a little loose but otherwise normal  Passing normal amounts of flatus  Denies abdominal pain, nausea, vomiting, fever, chills, shortness of breath, chest pain  Review of Systems   Constitutional: Negative for chills, diaphoresis and fever  Respiratory: Negative for shortness of breath  Cardiovascular: Negative for chest pain  Gastrointestinal: Negative for abdominal distention, abdominal pain, nausea and vomiting  Genitourinary: Negative  All other systems reviewed and are negative        Objective:     Blood pressure 122/64, pulse 92, temperature 98 2 °F (36 8 °C), temperature source Oral, resp  rate 15, height 5' 5" (1 651 m), weight 73 6 kg (162 lb 4 1 oz), SpO2 93 %  ,Body mass index is 27 kg/m²  Intake/Output Summary (Last 24 hours) at 12/11/2019 0835  Last data filed at 12/11/2019 0800  Gross per 24 hour   Intake 6699 69 ml   Output 425 ml   Net 6274 69 ml       Invasive Devices     Peripheral Intravenous Line            Peripheral IV 12/10/19 Left Antecubital less than 1 day    Peripheral IV 12/10/19 Right Antecubital less than 1 day    Peripheral IV 12/10/19 Right Hand less than 1 day                Physical Exam   Constitutional: He is oriented to person, place, and time  He appears well-developed  No distress  Thin-appearing   HENT:   Head: Normocephalic and atraumatic  Eyes: EOM are normal    Neck: Normal range of motion  Neck supple  Cardiovascular: Normal rate, regular rhythm and normal heart sounds  Exam reveals no gallop and no friction rub  No murmur heard  Pulmonary/Chest: Effort normal and breath sounds normal  No stridor  No respiratory distress  He has no wheezes  He has no rales  Abdominal: Soft  Bowel sounds are normal  He exhibits no distension and no mass  There is no tenderness  Well-healed incisions from prior abd surgery   Neurological: He is alert and oriented to person, place, and time  Skin: Skin is warm and dry  He is not diaphoretic  Nursing note and vitals reviewed           Scheduled Meds:  Current Facility-Administered Medications:  atorvastatin 10 mg Oral Daily Felipeidimarbenjamin Hassan PA-C    cefazolin 2,000 mg Intravenous Q8H Anny Hassan PA-C Last Rate: Stopped (12/11/19 0510)   chlorhexidine 15 mL Swish [employer] Spit Q12H [address] Heidimarie I CONNER Hassan    dextrose 5 % and sodium chloride 0 9 % 250 mL/hr Intravenous Continuous Heidimarie I CONNER Hassan Last Rate: 250 mL/hr (12/11/19 1186)   heparin (porcine) 3-30 Units/kg/hr (Order-Specific) Intravenous Titrated Heidimarie I CONNER Hassan Last Rate: 15 Units/kg/hr (12/11/19 0028)   heparin (porcine) 3,200 Units Intravenous PRN Heidimarie I Opperman, PA-C    heparin (porcine) 6,400 Units Intravenous PRN Heidimarie I Opperman, PA-C    insulin regular (HumuLIN R,NovoLIN R) infusion 0 1-30 Units/hr Intravenous Continuous Heidimarie I Opperman, PA-C Last Rate: 7 8 Units/hr (12/10/19 2154)   metroNIDAZOLE 500 mg Intravenous Q8H Heidimarie I Opperman, PA-C Last Rate: Stopped (12/11/19 0417)   pantoprazole 40 mg Oral Early Morning Heidimarie I Opnereida, PA-C    potassium phosphate 30 mmol Intravenous Once Heidimarie I Opperman, PA-C Last Rate: 30 mmol (12/11/19 0628)   sodium chloride 250 mL/hr Intravenous Continuous Heidimarie I Oppereva, PA-C Last Rate: Stopped (12/11/19 0559)     Continuous Infusions:  dextrose 5 % and sodium chloride 0 9 % 250 mL/hr Last Rate: 250 mL/hr (12/11/19 9899)   heparin (porcine) 3-30 Units/kg/hr (Order-Specific) Last Rate: 15 Units/kg/hr (12/11/19 4021)   insulin regular (HumuLIN R,NovoLIN R) infusion 0 1-30 Units/hr Last Rate: 7 8 Units/hr (12/10/19 2154)   sodium chloride 250 mL/hr Last Rate: Stopped (12/11/19 0559)     PRN Meds: heparin (porcine)    heparin (porcine)      Lab, Imaging and other studies:  I have personally reviewed pertinent lab results    , CBC:   Lab Results   Component Value Date    WBC 11 61 (H) 12/10/2019    HGB 11 5 (L) 12/10/2019    HCT 36 2 (L) 12/10/2019    MCV 81 (L) 12/10/2019     12/10/2019    MCH 25 6 (L) 12/10/2019    MCHC 31 8 12/10/2019    RDW 16 5 (H) 12/10/2019    MPV 10 0 12/10/2019    NRBC 0 12/10/2019   , CMP:   Lab Results   Component Value Date    SODIUM 137 12/11/2019    K 3 4 (L) 12/11/2019     (H) 12/11/2019    CO2 16 (L) 12/11/2019    BUN 14 12/11/2019    CREATININE 0 98 12/11/2019    CALCIUM 7 5 (L) 12/11/2019    AST 17 12/10/2019    ALT 20 12/10/2019    ALKPHOS 188 (H) 12/10/2019    EGFR 99 12/11/2019   , Coagulation:   Lab Results   Component Value Date    INR 1 29 (H) 12/10/2019 , Urinalysis:   Lab Results   Component Value Date    COLORU Yellow 12/10/2019    CLARITYU Clear 12/10/2019    SPECGRAV 1 015 12/10/2019    PHUR 5 0 12/10/2019    LEUKOCYTESUR Negative 12/10/2019    NITRITE Negative 12/10/2019    GLUCOSEU 500 (1/2%) (A) 12/10/2019    KETONESU >=160 (4+) (A) 12/10/2019    BILIRUBINUR Negative 12/10/2019    BLOODU Trace (A) 12/10/2019   , Amylase: No results found for: AMYLASE, Lipase: No results found for: LIPASE     CTA chest/abd/pelvis w/ contrast 12/10:  Multiple acute pulmonary emboli are identified within the right upper and lower lobe including an embolus within the distal aspect of the right main pulmonary artery just proximal to its bifurcation  The calculated ratio of right ventricular to left ventricular diameter (RV/LV ratio) is 1 65  This is greater than 0 9, which is abnormal and indicates right heart strain  An abnormal RV/LV ratio has been shown to be associated with an increased risk of 30 day mortality in the setting of acute pulmonary embolism  Urgent consultation with the medical critical care team is recommended  Acute contained perforation of the distal left colon and proximal sigmoid colon within the left paramedian lower abdomen and pelvis where there is a collection of extraluminal stool and free air, just proximal to a previous surgical anastomosis       VTE Pharmacologic Prophylaxis: Heparin  VTE Mechanical Prophylaxis: sequential compression device      Calli Gupta PA-C  12/11/2019 8:35 AM

## 2019-12-11 NOTE — H&P
H&P Exam - Critical Care   Josef López 64 y o  male MRN: [id number]  Unit/Bed#: ED 15 Encounter: [id number]      Patient Active Problem List   Diagnosis    Diabetic ketoacidosis without coma associated with type 2 diabetes mellitus ([facility] )    Bandemia    ALIA (acute kidney injury) ([facility] )    Lactic acidosis    Increased anion gap metabolic acidosis    Hypoalbuminemia due to protein-calorie malnutrition (HCC)    Perforated sigmoid colon (HCC)    Acute pulmonary embolism without acute cor pulmonale (HCC)    Acute respiratory insufficiency         -------------------------------------------------------------------------------------------------------------  Chief Complaint:  Weakness    History of Present Illness   HX and PE limited by:   Josef López is a 64 y o  male with a past medical history of type 2 diabetes hyperlipidemia status post colon resection at [facility] 11/13/2019 by Dr Charlotte Smith, he was in the hospital for approximately 4 days was discharged and approximately 2 weeks ago he started feeling more weak had significant shortness of breath on exertion feels lightheaded had left leg swelling with associated pain under ability to eat had 1 episode of vomiting chills and nausea on loose stool  He stated he lost approximately 20 lb over the last month  He stated he is fine when he lays down but this when he gets up and walks a short distance he is severely winded denies any chest pain or abdominal pain  Was last seen by his primary care doctor on 12/05/2019  Per primary care's note patient was seen in the ER before that and his is follow-up visit after his ER visit he had an elevated white count decreased platelets and was in sinus tachycardia his sugar at that point also was 474  His hemoglobin A1c was 9 7 in September  Per Oncology does not need chemotherapy or radiation did surgery for the cancerous polyp was cured  The primary care note stated no shortness of breath     Dr Charlotte Smith was contacted by the ER patient to be admitted to ICU general surgery to be consult will evaluate patient in the a m  History obtained from chart review and the patient   -------------------------------------------------------------------------------------------------------------  Assessment and Plan:    Neuro:    No issues   Pain control of Tylenol p r n        CV:  Sinus tachycardia, pulmonary embolism with right heart strain  o Plan:  Echocardiogram for right heart strain  o Troponin x3  o Heparin GTT  o Will need anticoagulation for at least 6 months  o Critical care monitoring  o Venous duplexes      Pulm:   Diagnosis:  Acute respiratory insufficiency, pulmonary embolism with right heart strain  o Plan:  Nasal cannula 2-4 L oxygenation greater than 90%  o Is on heparin GTT  o Pulse ox monitoring  o Incentive spirometer      GI:    Diagnosis:  Status post sigmoid resection with anastomosis by Kevin Toledo, sigmoid colon perforation with contained air and stool  o Plan:  In in-house surgical PA saw patient  o Patient to be seen by Dr Angie Smith in the a m   o Started on Ancef and Flagyl  o Abdominal exams  o Keep NPO  o Protonix 40 mg p o   o Hypoalbumin / male nutrition  o Nutrition consult  o Probably related to recent surgery      :    Acute kidney injury unknown baseline creatinine is 1 44 has no diagnosis assume normally is at 1   Patient is in DKA- fluid rehydration   Repeat creatinine   Monitor ins and outs   Hold Nephro toxic agent        F/E/N:    Plan:  Electrolyte  replacement protocol in place      Heme/Onc:    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  o Plan:  No chemotherapy or radiation per primary care notes surgery was curative  o Anemia mild  o Probably related to recent surgery      Endo:    Diagnosis:  Type 2 diabetes in diabetic ketoacidosis without coma  o Plan:  Electrolyte replacement protocol  o Fluids per DKA protocol  o DKA insulin drip  o BMP Mag and phos q 4 hours  o Hemoglobin A1c in the a m   o Endocrine count   Diagnosis:  Anion gap acidosis  o Plan:  Due to DKA  o Monitor BMP until anion gap acidosis clear  o Fluid administration      ID:    Diagnosis:  Leukocytosis with bandemia  o Plan:  Probably related to sigmoid perforation found on CT scan  o Ancef and Flagyl for surgery  o Continue to monitor white count and fever curve  o Surgery consult  o Procalcitonin  o Blood cultures are pending  o Urine analysis      MSK/Skin:    Diagnosis:  No issues        Disposition: Continue Critical Care   Code Status: No Order  --------------------------------------------------------------------------------------------------------------  Review of Systems   Constitutional: Positive for appetite change, chills and fatigue  Negative for fever  HENT: Negative for drooling, ear pain, facial swelling, rhinorrhea, trouble swallowing and voice change  Eyes: Negative  Negative for pain and redness  Respiratory: Positive for shortness of breath  Negative for cough, chest tightness, wheezing and stridor  Cardiovascular: Positive for leg swelling  Negative for chest pain and palpitations  Gastrointestinal: Negative  Negative for abdominal pain, blood in stool, nausea and vomiting  Endocrine: Negative  Negative for polydipsia, polyphagia and polyuria  Genitourinary: Positive for hematuria  Negative for difficulty urinating, dysuria, flank pain, frequency and urgency  Musculoskeletal: Negative  Negative for arthralgias, joint swelling, myalgias, neck pain and neck stiffness  Skin: Negative  Negative for pallor, rash and wound  Allergic/Immunologic: Negative  Negative for environmental allergies, food allergies and immunocompromised state  Neurological: Positive for dizziness, weakness and light-headedness  Negative for tremors, seizures, syncope, numbness and headaches  Hematological: Negative  Does not bruise/bleed easily  Psychiatric/Behavioral: Negative  Negative for agitation, confusion, hallucinations, self-injury and sleep disturbance  The patient is not hyperactive  A 12-point, complete review of systems was reviewed and negative except as stated above     Physical Exam   Constitutional: He is oriented to person, place, and time  He appears well-developed  Patient appears this no - toxic    HENT:   Head: Normocephalic and atraumatic  Eyes: Pupils are equal, round, and reactive to light  EOM are normal    Neck: Normal range of motion  Neck supple  No JVD present  No tracheal deviation present  Cardiovascular: Regular rhythm, normal heart sounds and intact distal pulses  No murmur heard  Tachycardia   Pulmonary/Chest: Effort normal and breath sounds normal  No stridor  No respiratory distress  He has no wheezes  Abdominal: Soft  Bowel sounds are normal  He exhibits no distension  There is no tenderness  There is no guarding  Small incision on the right lower abdomen   Genitourinary: Penis normal    Musculoskeletal: Normal range of motion  He exhibits edema and tenderness  He exhibits no deformity  Left extremity is swollen and tender feels firm   Neurological: He is alert and oriented to person, place, and time  Skin: Skin is warm and dry  Capillary refill takes less than 2 seconds  Psychiatric: He has a normal mood and affect  His behavior is normal    Nursing note and vitals reviewed      --------------------------------------------------------------------------------------------------------------  Historical Information   Past Medical History:   Diagnosis Date    Diabetes mellitus ([facility] )     Hyperlipidemia      Past Surgical History:   Procedure Laterality Date    COLECTOM[facility]  11[facility]13/2019    [facility]    COLONOSCOPY  10/2019    HERNIA REPAIR Left 04/2019    [facility]    POLYPECTOMY       Social History   Social History     Substance and Sexual Activity   Alcohol Use Not Currently    Comment: soc Social History     Substance and Sexual Activity   Drug Use Not Currently    Types: "Crack" cocaine, Marijuana     Social History     Tobacco Use   Smoking Status Never Smoker   Smokeless Tobacco Never Used     Exercise History: N/A  Family History:   Family History   Problem Relation Age of Onset    Hypertension Mother     Cancer Father     Alcohol abuse Father     No Known Problems Son     No Known Problems Daughter      I have reviewed this patient's family history and commented on sigificant items within the HPI    Vitals:   Vitals:    12/10/19 1900 12/10/19 2000 12/10/19 2009 12/10/19 2100   BP: 145/88  141/92 150/89   BP Location: Left arm  Right arm Right arm   Pulse: 100  104 (!) 114   Resp:   18    Temp:       TempSrc:       SpO2: 100%  100% 100%   Weight:  78 kg (172 lb)       Temp  Min: 97 5 °F (36 4 °C)  Max: 97 5 °F (36 4 °C)  IBW: -88 kg     There is no height or weight on file to calculate BMI  N/A    Medications:  Current Facility-Administered Medications   Medication Dose Route Frequency    [START ON 12/11/2019] ceFAZolin (ANCEF) IVPB (premix) 2,000 mg  2,000 mg Intravenous Q8H    heparin (porcine) 25,000 units in 250 mL infusion (premix)  3-30 Units/kg/hr (Order-Specific) Intravenous Titrated    heparin (porcine) injection 3,200 Units  3,200 Units Intravenous PRN    heparin (porcine) injection 6,400 Units  6,400 Units Intravenous PRN    insulin regular (HumuLIN R,NovoLIN R) 1 Units/mL in sodium chloride 0 9 % 100 mL infusion  0 1-30 Units/hr Intravenous Continuous    metroNIDAZOLE (FLAGYL) IVPB (premix) 500 mg  500 mg Intravenous Once    [START ON 12/11/2019] metroNIDAZOLE (FLAGYL) IVPB (premix) 500 mg  500 mg Intravenous Q8H    sodium chloride 0 9 % infusion  250 mL/hr Intravenous Continuous     Home medications:  Prior to Admission Medications   Prescriptions Last Dose Informant Patient Reported? Taking?    Empagliflozin (JARDIANCE PO) Past Week at Unknown time  Yes Yes   Sig: Take 10 mg by mouth daily   atorvastatin (LIPITOR) 10 mg tablet Past Week at Unknown time  Yes Yes   Sig: Take 10 mg by mouth daily   insulin glargine (TOUJEO MAX) 300 units/mL CONCETRATED U-300 injection pen (2-unit dial) Past Week at Unknown time  Yes Yes   Sig: Inject 10 Units under the skin daily   metFORMIN (GLUCOPHAGE) 1000 MG tablet Past Week at Unknown time  Yes Yes   Sig: Take 1,000 mg by mouth daily      Facility-Administered Medications: None     Allergies:  No Known Allergies      Laboratory and Diagnostics:  Results from last 7 days   Lab Units 12/10/19  1850   WBC Thousand/uL 11 61*   HEMOGLOBIN g/dL 11 5*   HEMATOCRIT % 36 2*   PLATELETS Thousands/uL 286   BANDS PCT % 10*   MONO PCT % 9     Results from last 7 days   Lab Units 12/10/19  1850   SODIUM mmol/L 127*   POTASSIUM mmol/L 5 0   CHLORIDE mmol/L 89*   CO2 mmol/L 12*   ANION GAP mmol/L 26*   BUN mg/dL 21   CREATININE mg/dL 1 44*   CALCIUM mg/dL 9 2   GLUCOSE RANDOM mg/dL 429*   ALT U/L 20   AST U/L 17   ALK PHOS U/L 188*   ALBUMIN g/dL 1 9*   TOTAL BILIRUBIN mg/dL 0 79          Results from last 7 days   Lab Units 12/10/19  2047   INR  1 29*   PTT seconds 24      Results from last 7 days   Lab Units 12/10/19  1850   TROPONIN I ng/mL <0 02     Results from last 7 days   Lab Units 12/10/19  2008   LACTIC ACID mmol/L 2 2*     ABG:    VBG:  Results from last 7 days   Lab Units 12/10/19  1950   PH HONORIO  7 290*   PCO2 HONORIO mm Hg 25 4*   PO2 HONORIO mm Hg 30 1*   HCO3 HONORIO mmol/L 11 9*   BASE EXC HONORIO mmol/L -13 0           Micro:          EKG:  Sinus tachycardia  Imaging: I have personally reviewed pertinent reports       CTA of the chest abdomen and pelvis:   Multiple acute pulmonary emboli are identified within the right upper and lower lobe including an embolus within the distal aspect of the right main pulmonary artery just proximal to its bifurcation      The calculated ratio of right ventricular to left ventricular diameter (RV/LV ratio) is 1 65     This is greater than 0 9, which is abnormal and indicates right heart strain  An abnormal RV/LV ratio has been shown to be associated with an increased risk of 30 day mortality in the setting of acute pulmonary embolism  Urgent consultation with the   medical critical care team is recommended      Acute contained perforation of the distal left colon and proximal sigmoid colon within the left paramedian lower abdomen and pelvis where there is a collection of extraluminal stool and free air, just proximal to a previous surgical anastomosis         ------------------------------------------------------------------------------------------------------------  Advance Directive and Living Will:      Power of :    POLST:    ------------------------------------------------------------------------------------------------------------  Anticipated Length of Stay is > 2 midnights    Counseling / Coordination of Care  Total Critical Care time spent 30 minutes excluding procedures, teaching and family updates  Anny Hassan PA-C        Portions of the record may have been created with voice recognition software  Occasional wrong word or "sound a like" substitutions may have occurred due to the inherent limitations of voice recognition software    Read the chart carefully and recognize, using context, where substitutions have occurred

## 2019-12-11 NOTE — SOCIAL WORK
LOS: 1 day  Readmission: No  Bundle: No    Consult(s): Dispo planning    Met with pt to introduce Cm services and complete assessment  Pt reports emergency contacts are as follows and verbalized agreement with staff calling them as needed:  1  Daughter Ngozi Pill [phone]  2  Son Jory Area [phone]    Pt reported the following:   Lives with/where: burt Corley Area in [place] apartment   House/apt and JORGE A: 2nd floor apartment with full flight of steps plus handrail to enter  Reports has been able to navigate steps without issue PTA   Bedroom/bathroom layout: all on same level in apartment and able to access without issue   Independent PTA: YES   Support and help at home from: son and also has daughter who lives locally  Is active in [employer] and reports  is supportive as well and came to visit him in hospital    DME at home: Yes, has cane but does not use often   Current or hx of VNA: No   Current or hx of PT/rehab: No but reports would be interested in PT/rehab services if recommended   Transportation: Drives himself and children also help with rides   Mental health: Denies   Substance use: Denies   PCP: Tesha Pride [phone]   Work/school/retired: works part time at [employer] Rx benefits: Yes and can afford copays   Preferred pharmacy: [facility], [place]   POA/LW: Has no formal paperwork but identifies daughter as health care rep  CM reviewed discharge planning process including the following: identifying caregivers at home, preference for d/c planning needs, availability of [facility] Meds to Bed program, availability of treatment team to discuss questions or concerns patient and/or family may have regarding diagnosis, plan of care, old or new medications and discharge planning  CM will continue to follow for care coordination and update assessment as appropriate

## 2019-12-11 NOTE — ED NOTES
Attempted to give report to ICU at this time  ICU RN giving report upstairs at this time  Will try again shortly        Erin Allan, RN  12/10/19 9320

## 2019-12-11 NOTE — CONSULTS
Consultation - [facility] Endocrinology    Patient Information: Domenic Pimentel 64 y o  male MRN: [id number]  Unit/Bed#: ICU 04 Encounter: [id number]  Admitting Physician: Mookie Phelps MD  PCP: Yousuf Cha MD  Date of Admission:  12/11/19    ASSESSMENT:    Diabetes mellitus type 2  Diabetic ketoacidosis  Pulmonary embolism and bilateral DVT  Colon cancer status post resection    PLAN:    Diabetic ketoacidosis for unknown reason most likely medication noncompliance - Patient was initiated on DKA protocol  He is currently on insulin drip requiring about 8 units of insulin per hour  His anion gap has closed  Most recent blood work showed closure of the anion gap, bicarb 20  I will switch him to nonDKA insulin protocol  Diabetes mellitus type 2 - Patient is at home on Jardiance 10 mg daily, toujeo 10 units in the evening and metformin 1000 mg twice a day  I will start patient on 30 units of Lantus this evening  I will stop insulin drip 2 hours after initiation of lantus  I will start patient on 10 units of Humalog with meals from tomorrow  I will initiate insulin sliding scale and diabetic diet  Pulmonary embolism and bilateral DVT - patient is currently on heparin drip    I will continue to monitor patient's blood sugars and make adjustments as needed  Patient will follow up with endocrinology as an outpatient  Reason for consultation:   Diabetic ketoacidosis    History of Present Illness:    Domenic Pimentel is a 64 y o  male with past medical history of type 2 diabetes, hyperlipidemia, status post colon resection at [facility] on November 13th was hospitalized for left leg swelling  Patient was found to have acute pulmonary embolism with right heart strain  He was also found to be in diabetic ketoacidosis  Patient was diagnosed with diabetes in 2006  He is currently taking Jardiance 10 mg daily, Toujeo 10 units in the evening and metformin 1000 mg twice a day    He says he measures his blood sugars every other day  His numbers are usually 140, 150  He denies any complications of diabetes such as neuropathy, nephropathy or retinopathy  He denies any blurry vision, excessive urination or thirst   He denies any history of myocardial infarction or stroke  He admits to family history of diabetes in his grandmother        Review of Systems:    Review of Systems   Constitutional: Negative for chills, fatigue and fever  HENT: Negative for congestion, postnasal drip and sore throat  Eyes: Negative for pain  Respiratory: Negative for cough, chest tightness and shortness of breath  Cardiovascular: Negative for chest pain, palpitations and leg swelling  Gastrointestinal: Negative for abdominal pain, blood in stool, constipation, diarrhea, nausea and vomiting  Endocrine: Negative for polydipsia and polyuria  Genitourinary: Negative for dysuria  Musculoskeletal: Negative for arthralgias and back pain  Neurological: Negative for dizziness, light-headedness and headaches  Psychiatric/Behavioral: Negative for agitation and behavioral problems  The patient is not nervous/anxious and is not hyperactive          Past Medical and Surgical History:     Past Medical History:   Diagnosis Date    Diabetes mellitus ([facility] [address] )     Hyperlipidemia     Renal disorder        Past Surgical History:   Procedure Laterality Date    [facility]    COLONOSCOPY  10/2019    HERNIA REPAIR Left 04/2019    [facility]    POLYPECTOMY         Meds/Allergies:    all medications and allergies reviewed    Allergies: No Known Allergies  History:     Marital Status: Single   Occupation:   Substance Use History:   Social History     Substance and Sexual Activity   Alcohol Use Not Currently    Frequency: Monthly or less    Drinks per session: 1 or 2    Comment: soc     Social History     Tobacco Use   Smoking Status Former Smoker    Years: 10 00    Types: Cigarettes   Smokeless Tobacco Never Used     Social History     Substance and Sexual Activity   Drug Use Not Currently    Types: "Crack" cocaine, Marijuana       Family History:    non-contributory    Physical Exam:     Vitals:   Blood Pressure: 112/65 (12/11/19 1200)  Pulse: 89 (12/11/19 1200)  Temperature: 97 6 °F (36 4 °C) (12/11/19 1047)  Temp Source: Oral (12/11/19 1047)  Respirations: 18 (12/11/19 1200)  Height: 5' 5" (165 1 cm) (12/11/19 0915)  Weight - Scale: 73 6 kg (162 lb 4 1 oz) (12/11/19 0539)  SpO2: 95 % (12/11/19 1200)    Physical Exam   Constitutional: He appears well-developed and well-nourished  HENT:   Head: Normocephalic and atraumatic  Eyes: Pupils are equal, round, and reactive to light  Conjunctivae are normal    Neck: Neck supple  Cardiovascular: Normal rate and regular rhythm  No murmur heard  Pulmonary/Chest: Effort normal and breath sounds normal  No respiratory distress  Abdominal: Soft  He exhibits no distension  There is no tenderness  Musculoskeletal: He exhibits no edema  Neurological: He is alert  Skin: Skin is warm and dry  Psychiatric: He has a normal mood and affect  Lab and Imaging Results: I have personally reviewed pertinent reports  Results from last 7 days   Lab Units 12/11/19  0938   WBC Thousand/uL 8 14   HEMOGLOBIN g/dL 9 2*   HEMATOCRIT % 28 2*   PLATELETS Thousands/uL 262   LYMPHO PCT % 18   MONO PCT % 8   EOS PCT % 1     Results from last 7 days   Lab Units 12/11/19  0938  12/10/19  1850   POTASSIUM mmol/L 3 7   < > 5 0   CHLORIDE mmol/L 109*   < > 89*   CO2 mmol/L 20*   < > 12*   BUN mg/dL 11   < > 21   CREATININE mg/dL 0 83   < > 1 44*   CALCIUM mg/dL 7 1*   < > 9 2   ALK PHOS U/L  --   --  188*   ALT U/L  --   --  20   AST U/L  --   --  17    < > = values in this interval not displayed       Results from last 7 days   Lab Units 12/10/19  2047   INR  1 29*     POC Glucose (mg/dl)   Date Value   12/11/2019 118   12/11/2019 107   12/11/2019 108   12/11/2019 139   12/11/2019 169 (H)   12/11/2019 256 (H)   12/11/2019 237 (H)   12/11/2019 219 (H)   12/11/2019 233 (H)   12/11/2019 240 (H)         ** Please Note: [employer] Dictation voice to text software may have been used in the creation of this document   **

## 2019-12-11 NOTE — PROGRESS NOTES
Daily Progress Note - Critical Care   Mau Medina 64 y o  male MRN: [id number]  Unit/Bed#: ICU 04 Encounter: [id number]        ----------------------------------------------------------------------------------------  HPI/24hr events:   Patient was admitted in DKA, also with pulmonary embolism started on a heparin drip  And a capsulated perforation of the sigmoid colon with air and stool  DKA is being addressed with a DKA protocol drip, Fluid BMPs q 4 hours sugars are reducing nicely anion gap is slowly reducing  Isolate 1 L extra given for correction of anion gap  Abdomen exam remains benign  When patient got up just distended bedside his heart rate went into the 170s heart rate at rest was 1 teens to 120 final decreasing to 90s  Patient and family was updated extensively about his problem, venous duplexes of bilateral lower extremity shows extensive clot burden the entire left leg and up to the knee on the right leg     ---------------------------------------------------------------------------------------  SUBJECTIVE  Patient feels comfortable denies any pain    Review of Systems   Constitutional: Negative for appetite change, diaphoresis, fatigue and fever  HENT: Negative for congestion, facial swelling, rhinorrhea, sneezing and tinnitus  Eyes: Negative for photophobia, pain and discharge  Respiratory: Negative for apnea, cough and shortness of breath  Cardiovascular: Negative for chest pain and leg swelling  Gastrointestinal: Negative for abdominal distention, abdominal pain, diarrhea, nausea and vomiting  Endocrine: Negative for cold intolerance, polydipsia and polyphagia  Genitourinary: Negative for enuresis, frequency and hematuria  Musculoskeletal: Negative for arthralgias, gait problem and myalgias  Skin: Negative for color change and rash  Allergic/Immunologic: Negative for environmental allergies and food allergies     Neurological: Negative for syncope, speech difficulty, light-headedness and headaches  Hematological: Negative for adenopathy  Does not bruise/bleed easily  Psychiatric/Behavioral: Negative for behavioral problems, decreased concentration and hallucinations  The patient is not hyperactive  Review of systems was reviewed and negative unless stated above in HPI/24-hour events   ---------------------------------------------------------------------------------------  Assessment and Plan:  Neuro:   · No issues   Pain control of Tylenol p r n         CV:  Sinus tachycardia, pulmonary embolism with right heart strain  ? Plan:  Echocardiogram for right heart strain  ? Troponin x3  ? Heparin GTT  ? Will need anticoagulation for at least 6 months  ? Critical care monitoring  ? Venous duplexes        Pulm:  · Diagnosis:  Acute respiratory insufficiency, pulmonary embolism with right heart strain  ? Plan:  Nasal cannula 2-4 L oxygenation greater than 90%  ? Is on heparin GTT  ? Pulse ox monitoring  ? Incentive spirometer        GI:   · Diagnosis:  Status post sigmoid resection with anastomosis by Vero Barrios, sigmoid colon perforation with contained air and stool  ? Plan:  In in-house surgical PA saw patient  ? Patient to be seen by Dr Sherry Thomson in the a m   ? Started on Ancef and Flagyl  ? Abdominal exams  ? Keep NPO  ? Protonix 40 mg p o   ? Hypoalbumin / male nutrition  ? Nutrition consult  ? 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 Plan:  No chemotherapy or radiation per primary care notes surgery was curative  ? Anemia mild  ?  Probably related to recent surgery        Endo: · Diagnosis:  Type 2 diabetes in diabetic ketoacidosis without coma  ? Plan:  Electrolyte replacement protocol  ? Fluids per DKA protocol  ? DKA insulin drip  ? BMP Mag and phos q 4 hours  ? Hemoglobin A1c in the a m   ? Endocrine count  · Diagnosis:  Anion gap acidosis  ? Plan:  Due to DKA  ? Monitor BMP until anion gap acidosis clear  ? Fluid administration        ID:   · Diagnosis:  Leukocytosis with bandemia  ? Plan:  Probably related to sigmoid perforation found on CT scan  ? Ancef and Flagyl for surgery  ? Continue to monitor white count and fever curve  ? Surgery consult  ? Procalcitonin  ? Blood cultures are pending  ? Urine analysis        MSK/Skin:   · Diagnosis:  No issues      Disposition: Continue Critical Care   Code Status: Level 1 - Full Code  ---------------------------------------------------------------------------------------  ICU CORE MEASURES    Prophylaxis   VTE Pharmacologic Prophylaxis: Heparin Drip  VTE Mechanical Prophylaxis: reason for no mechanical VTE prophylaxis Patient has bilateral clot burden in lower extremities  Stress Ulcer Prophylaxis: Pantoprazole PO    ABCDE Protocol (if indicated)  Plan to perform spontaneous awakening trial today? Not applicable  Plan to perform spontaneous breathing trial today? Not applicable  Obvious barriers to extubation? Not applicable  CAM-ICU: Negative    Invasive Devices Review  Invasive Devices     Peripheral Intravenous Line            Peripheral IV 12/10/19 Left Antecubital less than 1 day    Peripheral IV 12/10/19 Right Antecubital less than 1 day    Peripheral IV 12/10/19 Right Hand less than 1 day              Can any invasive devices be discontinued today? Not applicable  ---------------------------------------------------------------------------------------  OBJECTIVE    Physical Exam   Constitutional: He is oriented to person, place, and time  He appears well-developed  Thin   HENT:   Head: Normocephalic and atraumatic     Eyes: Pupils are equal, round, and reactive to light  EOM are normal    Neck: Normal range of motion  Neck supple  Cardiovascular: Normal rate, regular rhythm, normal heart sounds and intact distal pulses  No murmur heard  Pulmonary/Chest: Effort normal and breath sounds normal  No stridor  No respiratory distress  He has no wheezes  Abdominal: Soft  Bowel sounds are normal  He exhibits no distension  There is no tenderness  There is no guarding  Genitourinary: Penis normal    Musculoskeletal: Normal range of motion  He exhibits edema and tenderness  Bilateral lower extremity mild swelling greater on left than on the right pain on the left   Neurological: He is alert and oriented to person, place, and time  No cranial nerve deficit  Skin: Skin is warm and dry  Capillary refill takes less than 2 seconds  Psychiatric: He has a normal mood and affect  His behavior is normal    Nursing note and vitals reviewed  Vitals   Vitals:    19 0300 19 0400 19 0500 19 0539   BP: 114/64 128/69 115/63    BP Location: Right arm      Pulse: 90 93 93    Resp: 16 17 16    Temp: 98 °F (36 7 °C)      TempSrc: Oral      SpO2: 99% 100% 99%    Weight:    73 6 kg (162 lb 4 1 oz)   Height:         Temp (24hrs), Av °F (36 7 °C), Min:97 5 °F (36 4 °C), Max:98 4 °F (36 9 °C)  Current: Temperature: 98 °F (36 7 °C)  HR:   BP: 120/58  RR: 16  SpO2: 96 %     Invasive/non-invasive ventilation settings   Respiratory    Lab Data (Last 4 hours)    None         O2/Vent Data (Last 4 hours)    None                Height and Weights   Height: 5' 5" (165 1 cm)  IBW: 61 5 kg  Body mass index is 27 kg/m²    Weight (last 2 days)     Date/Time   Weight    19 0539   73 6 (162 26)    12/10/19 2249   73 6 (162 26)    12/10/19 2000   78 (172)                Intake and Output  I/O        07 - 12/10 0700 12/10 07 -  0700    I V  (mL/kg)  1822 3 (24 8)    IV Piggyback  1150    Total Intake(mL/kg)  2972 3 (40 4) Net  +2972 3                  Nutrition       Diet Orders   (From admission, onward)             Start     Ordered    12/10/19 2255  Room Service  Once     Question:  Type of Service  Answer:  Room Service - Appropriate with Assistance    12/10/19 2254    12/10/19 2246  Diet NPO  Diet effective now     Question Answer Comment   Diet Type NPO    RD to adjust diet per protocol? Yes        12/10/19 2245                  Laboratory and Diagnostics:  Results from last 7 days   Lab Units 12/10/19  1850   WBC Thousand/uL 11 61*   HEMOGLOBIN g/dL 11 5*   HEMATOCRIT % 36 2*   PLATELETS Thousands/uL 286   BANDS PCT % 10*   MONO PCT % 9     Results from last 7 days   Lab Units 12/11/19  0450 12/10/19  2258 12/10/19  1850   SODIUM mmol/L 137 131* 127*   POTASSIUM mmol/L 3 4* 4 1 5 0   CHLORIDE mmol/L 109* 97* 89*   CO2 mmol/L 16* 10* 12*   ANION GAP mmol/L 12 24* 26*   BUN mg/dL 14 19 21   CREATININE mg/dL 0 98 1 29 1 44*   CALCIUM mg/dL 7 5* 8 4 9 2   GLUCOSE RANDOM mg/dL 209* 317* 429*   ALT U/L  --   --  20   AST U/L  --   --  17   ALK PHOS U/L  --   --  188*   ALBUMIN g/dL  --   --  1 9*   TOTAL BILIRUBIN mg/dL  --   --  0 79     Results from last 7 days   Lab Units 12/11/19  0450 12/10/19  2258   MAGNESIUM mg/dL 1 8 2 1   PHOSPHORUS mg/dL 1 9* 2 1*      Results from last 7 days   Lab Units 12/11/19  0450 12/10/19  2047   INR   --  1 29*   PTT seconds 175* 24      Results from last 7 days   Lab Units 12/11/19  0450 12/11/19  0137 12/10/19  2258 12/10/19  1850   TROPONIN I ng/mL 0 02 0 02 <0 02 <0 02     Results from last 7 days   Lab Units 12/11/19  0450 12/10/19  2008   LACTIC ACID mmol/L 1 2 2 2*     ABG:    VBG:  Results from last 7 days   Lab Units 12/10/19  1950   PH HONORIO  7 290*   PCO2 HONORIO mm Hg 25 4*   PO2 HONORIO mm Hg 30 1*   HCO3 HONORIO mmol/L 11 9*   BASE EXC HONORIO mmol/L -13 0           Micro  Results from last 7 days   Lab Units 12/10/19  2016 12/10/19  2008   BLOOD CULTURE  Received in Microbiology Lab   Culture in Progress  Received in Microbiology Lab  Culture in Progress  EKG:  Normal sinus rhythm  Imaging: I have personally reviewed pertinent reports        Active Medications  Scheduled Meds:    Current Facility-Administered Medications:  atorvastatin 10 mg Oral Daily Anny Hassan PA-C    calcium gluconate 1 g Intravenous Once Heidimarie I CONNER Hassan    cefazolin 2,000 mg Intravenous Q8H Heidimarie I CONNER Hassan Last Rate: Stopped (12/11/19 0510)   chlorhexidine 15 mL Swish & Spit Q12H [facility] I CONNER Hassan    dextrose 5 % and sodium chloride 0 9 % 250 mL/hr Intravenous Continuous Heidimarie I CONNER Hassan Last Rate: 250 mL/hr (12/11/19 0411)   heparin (porcine) 3-30 Units/kg/hr (Order-Specific) Intravenous Titrated Anny I CONNER Hassan Last Rate: Stopped (12/11/19 0533)   heparin (porcine) 3,200 Units Intravenous PRN Heidimarie I MELIZA Hassan-WILLIAM    heparin (porcine) 6,400 Units Intravenous PRN Heidimarie I CONNER Hassan    insulin regular (HumuLIN R,NovoLIN R) infusion 0 1-30 Units/hr Intravenous Continuous Hecarolinamarbenjamin I CONNER Hassan Last Rate: 7 8 Units/hr (12/10/19 2154)   magnesium sulfate 2 g Intravenous Once Hedany Hassan PA-C    metroNIDAZOLE 500 mg Intravenous Q8H Hedany I CONNER Hassan Last Rate: Stopped (12/11/19 0417)   pantoprazole 40 mg Oral Early Morning Heidival I CONNER Hassan    potassium chloride 20 mEq Oral Once Heidival I CONNER Hassan    potassium phosphate 30 mmol Intravenous Once Heidival I CONNER Hassan    sodium chloride 250 mL/hr Intravenous Continuous Heidival I CONNER Hassan Last Rate: 250 mL/hr (12/11/19 0413)     Continuous Infusions:    dextrose 5 % and sodium chloride 0 9 % 250 mL/hr Last Rate: 250 mL/hr (12/11/19 0411)   heparin (porcine) 3-30 Units/kg/hr (Order-Specific) Last Rate: Stopped (12/11/19 8956)   insulin regular (HumuLIN R,NovoLIN R) infusion 0 1-30 Units/hr Last Rate: 7 8 Units/hr (12/10/19 2366)   sodium chloride 250 mL/hr Last Rate: 250 mL/hr (12/11/19 0413)     PRN Meds:     heparin (porcine) 3,200 Units PRN   heparin (porcine) 6,400 Units PRN       Allergies   No Known Allergies    Advance Directive and Living Will:      Power of :    POLST:        Counseling / Coordination of Care  Total Critical Care time spent 30 minutes excluding procedures, teaching and family updates  Anny Hassan PA-C        Portions of the record may have been created with voice recognition software  Occasional wrong word or "sound a like" substitutions may have occurred due to the inherent limitations of voice recognition software    Read the chart carefully and recognize, using context, where substitutions have occurred

## 2019-12-11 NOTE — CONSULTS
Consult performed by Patricia Johnston PA-C on 12/10 at 8:35 PM        Wang Vargas PA-C  12/11/2019 9:27 AM

## 2019-12-12 PROBLEM — I82.409 ACUTE DEEP VEIN THROMBOSIS (DVT) OF LOWER EXTREMITY (HCC): Status: ACTIVE | Noted: 2019-12-12

## 2019-12-12 PROBLEM — D72.825 BANDEMIA: Status: RESOLVED | Noted: 2019-12-10 | Resolved: 2019-12-12

## 2019-12-12 PROBLEM — D64.9 ANEMIA: Status: ACTIVE | Noted: 2019-12-12

## 2019-12-12 PROBLEM — R06.89 ACUTE RESPIRATORY INSUFFICIENCY: Status: RESOLVED | Noted: 2019-12-10 | Resolved: 2019-12-12

## 2019-12-12 LAB
ANION GAP SERPL CALCULATED.3IONS-SCNC: 11 MMOL/L (ref 4–13)
APTT PPP: 108 SECONDS (ref 23–37)
APTT PPP: 110 SECONDS (ref 23–37)
APTT PPP: 55 SECONDS (ref 23–37)
ATRIAL RATE: 105 BPM
BASOPHILS # BLD AUTO: 0.04 THOUSANDS/ΜL (ref 0–0.1)
BASOPHILS NFR BLD AUTO: 1 % (ref 0–1)
BUN SERPL-MCNC: 5 MG/DL (ref 5–25)
CALCIUM SERPL-MCNC: 7.2 MG/DL (ref 8.3–10.1)
CHLORIDE SERPL-SCNC: 110 MMOL/L (ref 100–108)
CO2 SERPL-SCNC: 19 MMOL/L (ref 21–32)
CREAT SERPL-MCNC: 0.64 MG/DL (ref 0.6–1.3)
EOSINOPHIL # BLD AUTO: 0.2 THOUSAND/ΜL (ref 0–0.61)
EOSINOPHIL NFR BLD AUTO: 2 % (ref 0–6)
ERYTHROCYTE [DISTWIDTH] IN BLOOD BY AUTOMATED COUNT: 16.8 % (ref 11.6–15.1)
EST. AVERAGE GLUCOSE BLD GHB EST-MCNC: 203 MG/DL
GFR SERPL CREATININE-BSD FRML MDRD: 126 ML/MIN/1.73SQ M
GLUCOSE SERPL-MCNC: 178 MG/DL (ref 65–140)
GLUCOSE SERPL-MCNC: 179 MG/DL (ref 65–140)
GLUCOSE SERPL-MCNC: 183 MG/DL (ref 65–140)
GLUCOSE SERPL-MCNC: 188 MG/DL (ref 65–140)
GLUCOSE SERPL-MCNC: 196 MG/DL (ref 65–140)
GLUCOSE SERPL-MCNC: 199 MG/DL (ref 65–140)
GLUCOSE SERPL-MCNC: 259 MG/DL (ref 65–140)
HBA1C MFR BLD: 8.7 % (ref 4.2–6.3)
HCT VFR BLD AUTO: 25 % (ref 36.5–49.3)
HGB BLD-MCNC: 8 G/DL (ref 12–17)
HGB BLD-MCNC: 9.8 G/DL (ref 12–17)
IMM GRANULOCYTES # BLD AUTO: 0.09 THOUSAND/UL (ref 0–0.2)
IMM GRANULOCYTES NFR BLD AUTO: 1 % (ref 0–2)
LYMPHOCYTES # BLD AUTO: 1.24 THOUSANDS/ΜL (ref 0.6–4.47)
LYMPHOCYTES NFR BLD AUTO: 15 % (ref 14–44)
MAGNESIUM SERPL-MCNC: 1.8 MG/DL (ref 1.6–2.6)
MCH RBC QN AUTO: 25.6 PG (ref 26.8–34.3)
MCHC RBC AUTO-ENTMCNC: 32 G/DL (ref 31.4–37.4)
MCV RBC AUTO: 80 FL (ref 82–98)
MONOCYTES # BLD AUTO: 1.18 THOUSAND/ΜL (ref 0.17–1.22)
MONOCYTES NFR BLD AUTO: 14 % (ref 4–12)
NEUTROPHILS # BLD AUTO: 5.57 THOUSANDS/ΜL (ref 1.85–7.62)
NEUTS SEG NFR BLD AUTO: 67 % (ref 43–75)
NRBC BLD AUTO-RTO: 0 /100 WBCS
P AXIS: 67 DEGREES
PHOSPHATE SERPL-MCNC: 1.6 MG/DL (ref 2.7–4.5)
PLATELET # BLD AUTO: 263 THOUSANDS/UL (ref 149–390)
PMV BLD AUTO: 9.8 FL (ref 8.9–12.7)
POTASSIUM SERPL-SCNC: 3.7 MMOL/L (ref 3.5–5.3)
PR INTERVAL: 132 MS
QRS AXIS: -1 DEGREES
QRSD INTERVAL: 76 MS
QT INTERVAL: 342 MS
QTC INTERVAL: 452 MS
RBC # BLD AUTO: 3.12 MILLION/UL (ref 3.88–5.62)
SODIUM SERPL-SCNC: 140 MMOL/L (ref 136–145)
T WAVE AXIS: 57 DEGREES
VENTRICULAR RATE: 105 BPM
WBC # BLD AUTO: 8.32 THOUSAND/UL (ref 4.31–10.16)

## 2019-12-12 PROCEDURE — NC001 PR NO CHARGE: Performed by: INTERNAL MEDICINE

## 2019-12-12 PROCEDURE — 82948 REAGENT STRIP/BLOOD GLUCOSE: CPT

## 2019-12-12 PROCEDURE — 80048 BASIC METABOLIC PNL TOTAL CA: CPT | Performed by: PHYSICIAN ASSISTANT

## 2019-12-12 PROCEDURE — 86341 ISLET CELL ANTIBODY: CPT | Performed by: PHYSICIAN ASSISTANT

## 2019-12-12 PROCEDURE — 85730 THROMBOPLASTIN TIME PARTIAL: CPT | Performed by: INTERNAL MEDICINE

## 2019-12-12 PROCEDURE — 84100 ASSAY OF PHOSPHORUS: CPT | Performed by: PHYSICIAN ASSISTANT

## 2019-12-12 PROCEDURE — 99232 SBSQ HOSP IP/OBS MODERATE 35: CPT | Performed by: INTERNAL MEDICINE

## 2019-12-12 PROCEDURE — 85018 HEMOGLOBIN: CPT | Performed by: INTERNAL MEDICINE

## 2019-12-12 PROCEDURE — 83519 RIA NONANTIBODY: CPT | Performed by: PHYSICIAN ASSISTANT

## 2019-12-12 PROCEDURE — 85730 THROMBOPLASTIN TIME PARTIAL: CPT | Performed by: PHYSICIAN ASSISTANT

## 2019-12-12 PROCEDURE — 93010 ELECTROCARDIOGRAM REPORT: CPT | Performed by: INTERNAL MEDICINE

## 2019-12-12 PROCEDURE — 83735 ASSAY OF MAGNESIUM: CPT | Performed by: PHYSICIAN ASSISTANT

## 2019-12-12 PROCEDURE — 83036 HEMOGLOBIN GLYCOSYLATED A1C: CPT | Performed by: PHYSICIAN ASSISTANT

## 2019-12-12 PROCEDURE — 85025 COMPLETE CBC W/AUTO DIFF WBC: CPT | Performed by: PHYSICIAN ASSISTANT

## 2019-12-12 RX ORDER — INSULIN GLARGINE 100 [IU]/ML
36 INJECTION, SOLUTION SUBCUTANEOUS
Status: DISCONTINUED | OUTPATIENT
Start: 2019-12-12 | End: 2019-12-13 | Stop reason: HOSPADM

## 2019-12-12 RX ORDER — MAGNESIUM SULFATE HEPTAHYDRATE 40 MG/ML
2 INJECTION, SOLUTION INTRAVENOUS ONCE
Status: COMPLETED | OUTPATIENT
Start: 2019-12-12 | End: 2019-12-12

## 2019-12-12 RX ADMIN — POTASSIUM & SODIUM PHOSPHATES POWDER PACK 280-160-250 MG 1 PACKET: 280-160-250 PACK at 16:37

## 2019-12-12 RX ADMIN — INSULIN LISPRO 10 UNITS: 100 INJECTION, SOLUTION INTRAVENOUS; SUBCUTANEOUS at 12:44

## 2019-12-12 RX ADMIN — INSULIN LISPRO 1 UNITS: 100 INJECTION, SOLUTION INTRAVENOUS; SUBCUTANEOUS at 02:26

## 2019-12-12 RX ADMIN — INSULIN LISPRO 3 UNITS: 100 INJECTION, SOLUTION INTRAVENOUS; SUBCUTANEOUS at 12:43

## 2019-12-12 RX ADMIN — INSULIN LISPRO 1 UNITS: 100 INJECTION, SOLUTION INTRAVENOUS; SUBCUTANEOUS at 18:23

## 2019-12-12 RX ADMIN — INSULIN GLARGINE 36 UNITS: 100 INJECTION, SOLUTION SUBCUTANEOUS at 21:25

## 2019-12-12 RX ADMIN — INSULIN LISPRO 2 UNITS: 100 INJECTION, SOLUTION INTRAVENOUS; SUBCUTANEOUS at 08:00

## 2019-12-12 RX ADMIN — MAGNESIUM SULFATE HEPTAHYDRATE 2 G: 40 INJECTION, SOLUTION INTRAVENOUS at 04:05

## 2019-12-12 RX ADMIN — METRONIDAZOLE 500 MG: 500 INJECTION, SOLUTION INTRAVENOUS at 11:13

## 2019-12-12 RX ADMIN — CEFAZOLIN SODIUM 2000 MG: 2 SOLUTION INTRAVENOUS at 02:12

## 2019-12-12 RX ADMIN — CEFAZOLIN SODIUM 2000 MG: 2 SOLUTION INTRAVENOUS at 10:15

## 2019-12-12 RX ADMIN — PANTOPRAZOLE SODIUM 40 MG: 40 TABLET, DELAYED RELEASE ORAL at 05:41

## 2019-12-12 RX ADMIN — SODIUM CHLORIDE, SODIUM GLUCONATE, SODIUM ACETATE, POTASSIUM CHLORIDE, MAGNESIUM CHLORIDE, SODIUM PHOSPHATE, DIBASIC, AND POTASSIUM PHOSPHATE 100 ML/HR: .53; .5; .37; .037; .03; .012; .00082 INJECTION, SOLUTION INTRAVENOUS at 00:03

## 2019-12-12 RX ADMIN — POTASSIUM & SODIUM PHOSPHATES POWDER PACK 280-160-250 MG 1 PACKET: 280-160-250 PACK at 08:07

## 2019-12-12 RX ADMIN — ATORVASTATIN CALCIUM 10 MG: 10 TABLET, FILM COATED ORAL at 08:07

## 2019-12-12 RX ADMIN — METRONIDAZOLE 500 MG: 500 INJECTION, SOLUTION INTRAVENOUS at 01:24

## 2019-12-12 RX ADMIN — HEPARIN SODIUM 3200 UNITS: 1000 INJECTION INTRAVENOUS; SUBCUTANEOUS at 11:43

## 2019-12-12 RX ADMIN — CEFAZOLIN SODIUM 2000 MG: 2 SOLUTION INTRAVENOUS at 18:21

## 2019-12-12 RX ADMIN — HEPARIN SODIUM AND DEXTROSE 15 UNITS/KG/HR: 10000; 5 INJECTION INTRAVENOUS at 17:51

## 2019-12-12 RX ADMIN — METRONIDAZOLE 500 MG: 500 INJECTION, SOLUTION INTRAVENOUS at 17:53

## 2019-12-12 RX ADMIN — INSULIN LISPRO 1 UNITS: 100 INJECTION, SOLUTION INTRAVENOUS; SUBCUTANEOUS at 21:26

## 2019-12-12 RX ADMIN — POTASSIUM PHOSPHATE, MONOBASIC AND POTASSIUM PHOSPHATE, DIBASIC 30 MMOL: 224; 236 INJECTION, SOLUTION, CONCENTRATE INTRAVENOUS at 04:17

## 2019-12-12 RX ADMIN — INSULIN LISPRO 10 UNITS: 100 INJECTION, SOLUTION INTRAVENOUS; SUBCUTANEOUS at 08:00

## 2019-12-12 NOTE — SOCIAL WORK
CM received call from Adarsh at [facility] who reports she received Eliquis Rx for patient  First 30 days are free with coupon card and then because patient has [employer], he is eligible for $10 coupon card moving forward  CM Dept will continue to assess for needs and will follow through discharge

## 2019-12-12 NOTE — ASSESSMENT & PLAN NOTE
Malnutrition Findings:           BMI Findings: Body mass index is 30 01 kg/m²       · Secondary to poor nutrition post colon resection  · Intake improving, good appetite now  · May benefit from Ensure or protein supplementation

## 2019-12-12 NOTE — ASSESSMENT & PLAN NOTE
? Provoked in setting of recent surgery and inactivity  ? 12/10 CT - Multiple acute pulmonary emboli are identified within the right upper and lower lobe including an embolus within the distal aspect of the right main pulmonary artery just proximal to its bifurcation   The calculated ratio of right ventricular to left ventricular diameter (RV/LV ratio) is 1 65    ? 12/11 ECHO - EF 70%, RV function normal  ? Cont Heparin gtt  § Awaiting plan from surgery regarding perforation  § If anticipated prolonged intervention or repeat procedures would benefit from IVC filter  § Will need min 6 months anticoagulation  § Transition to oral anticoagulation once surgery interventions completed

## 2019-12-12 NOTE — ASSESSMENT & PLAN NOTE
? 12/11 CT - Acute contained perforation of the distal left colon and proximal sigmoid colon within the left paramedian lower abdomen and pelvis where there is a collection of extraluminal stool and free air, just proximal to a previous surgical anastomosis  ? Surgery performed 11/13 by Dr Kingsley Newton  ? Surgery eval and plan - pending  ? Possible IR for drainage vs conservative mgmt with ABX vs lap  ? Protonix daily  ?  Bowel regimen - not indicated at this time, pt had 'normal bm' yesterday, no current opioid use

## 2019-12-12 NOTE — ASSESSMENT & PLAN NOTE
Lab Results   Component Value Date    HGBA1C 8 7 (H) 12/12/2019       Recent Labs     12/11/19  2204 12/12/19  0005 12/12/19  0222 12/12/19  0737   POCGLU 254* 196* 188* 199*       Blood Sugar Average: Last 72 hrs:  (P) 201 375     ? DKA now resolved  ? Endocrine following  § Hgb A1C 8 7  ?  Cont Lantus and SSI

## 2019-12-12 NOTE — QUICK NOTE
I have reviewed patient's chart his blood sugars still elevated  I will increase lantus to 36 units and humalog to 12 units w meals

## 2019-12-12 NOTE — ASSESSMENT & PLAN NOTE
· Admission Hgb 11 5  · Current Hgb 8 0  · Repeat Hgb this afternoon  · No active signs of bleeding on Heparin infusion, likely dilutional post IVF resuscitation

## 2019-12-12 NOTE — SOCIAL WORK
CM received call from Beatriz Galindo with [employer] offering assistance re: discharge planning  CM Department can contact Chela Ness if needed at [phone]  CM Department will continue to follow patient

## 2019-12-12 NOTE — PLAN OF CARE
Problem: Prexisting or High Potential for Compromised Skin Integrity  Goal: Skin integrity is maintained or improved  Description  INTERVENTIONS:  - Identify patients at risk for skin breakdown  - Assess and monitor skin integrity  - Assess and monitor nutrition and hydration status  - Monitor labs   - Assess for incontinence   - Turn and reposition patient  - Assist with mobility/ambulation  - Relieve pressure over bony prominences  - Avoid friction and shearing  - Provide appropriate hygiene as needed including keeping skin clean and dry  - Evaluate need for skin moisturizer/barrier cream  - Collaborate with interdisciplinary team   - Patient/family teaching  - Consider wound care consult   Outcome: Progressing     Problem: Potential for Falls  Goal: Patient will remain free of falls  Description  INTERVENTIONS:  - Assess patient frequently for physical needs  -  Identify cognitive and physical deficits and behaviors that affect risk of falls  -  [place] fall precautions as indicated by assessment   - Educate patient/family on patient safety including physical limitations  - Instruct patient to call for assistance with activity based on assessment  - Modify environment to reduce risk of injury  - Consider OT/PT consult to assist with strengthening/mobility  Outcome: Progressing     Problem: Nutrition/Hydration-ADULT  Goal: Nutrient/Hydration intake appropriate for improving, restoring or maintaining nutritional needs  Description  Monitor and assess patient's nutrition/hydration status for malnutrition  Collaborate with interdisciplinary team and initiate plan and interventions as ordered  Monitor patient's weight and dietary intake as ordered or per policy  Utilize nutrition screening tool and intervene as necessary  Determine patient's food preferences and provide high-protein, high-caloric foods as appropriate       INTERVENTIONS:  - Monitor oral intake, urinary output, labs, and treatment plans  - Assess nutrition and hydration status and recommend course of action  - Evaluate amount of meals eaten  - Assist patient with eating if necessary   - Allow adequate time for meals  - Recommend/ encourage appropriate diets, oral nutritional supplements, and vitamin/mineral supplements  - Order, calculate, and assess calorie counts as needed  - Recommend, monitor, and adjust tube feedings and TPN/PPN based on assessed needs  - Assess need for intravenous fluids  - Provide specific nutrition/hydration education as appropriate  - Include patient/family/caregiver in decisions related to nutrition  Outcome: Progressing     Problem: PAIN - ADULT  Goal: Verbalizes/displays adequate comfort level or baseline comfort level  Description  Interventions:  - Encourage patient to monitor pain and request assistance  - Assess pain using appropriate pain scale  - Administer analgesics based on type and severity of pain and evaluate response  - Implement non-pharmacological measures as appropriate and evaluate response  - Consider cultural and social influences on pain and pain management  - Notify physician/advanced practitioner if interventions unsuccessful or patient reports new pain  Outcome: Progressing     Problem: INFECTION - ADULT  Goal: Absence or prevention of progression during hospitalization  Description  INTERVENTIONS:  - Assess and monitor for signs and symptoms of infection  - Monitor lab/diagnostic results  - Monitor all insertion sites, i e  indwelling lines, tubes, and drains  - Monitor endotracheal if appropriate and nasal secretions for changes in amount and color  - [place] appropriate cooling/warming therapies per order  - Administer medications as ordered  - Instruct and encourage patient and family to use good hand hygiene technique  - Identify and instruct in appropriate isolation precautions for identified infection/condition  Outcome: Progressing  Goal: Absence of fever/infection during neutropenic period  Description  INTERVENTIONS:  - Monitor WBC    Outcome: Progressing     Problem: SAFETY ADULT  Goal: Patient will remain free of falls  Description  INTERVENTIONS:  - Assess patient frequently for physical needs  -  Identify cognitive and physical deficits and behaviors that affect risk of falls    -  [place] fall precautions as indicated by assessment   - Educate patient/family on patient safety including physical limitations  - Instruct patient to call for assistance with activity based on assessment  - Modify environment to reduce risk of injury  - Consider OT/PT consult to assist with strengthening/mobility  Outcome: Progressing     Problem: DISCHARGE PLANNING  Goal: Discharge to home or other facility with appropriate resources  Description  INTERVENTIONS:  - Identify barriers to discharge w/patient and caregiver  - Arrange for needed discharge resources and transportation as appropriate  - Identify discharge learning needs (meds, wound care, etc )  - Arrange for interpretive services to assist at discharge as needed  - Refer to Case Management Department for coordinating discharge planning if the patient needs post-hospital services based on physician/advanced practitioner order or complex needs related to functional status, cognitive ability, or social support system  Outcome: Progressing

## 2019-12-12 NOTE — PROGRESS NOTES
Progress Note - General Surgery   Nighat Avalos 64 y o  male MRN: [id number]  Unit/Bed#: ICU 04 Encounter: [id number]    Assessment:  27-year-old male with acute PE, DKA, ALIA and status post recent colon resection for a cancerous polyp on 11/13 by Dr Liisa Lesch  Now found to have acute contained perforation of the distal left colon and proximal sigmoid colon on 12/11 CT  Plan:  1  Acute contained perforation of the distal left colon and proximal sigmoid colon  - afebrile, VSS, denies fevers/chills  - no leukocytosis, white count 8 32 12/12, continue to trend  - conservative management at this time, IV abx  - abd exam negative  - will discuss with attending  - Dr Liisa Lesch to see later today    - PRN pain control  - IV fluids  - IV abx  - Encourage deep breathing  - rest of comorbidities to be managed by primary medicine team      Subjective/Objective   Chief Complaint: no acute complaints this am    Subjective: Patient is a 27-year-old male with acute PE, DKA, ALIA and status post recent colon resection for cancer follow-up on 11/13 by Dr Liisa Lesch, now with acute contained perforation of the distal left colon and proximal sigmoid colon on 12/11 CT  Patient interviewed sitting up in chair, offers no acute complaints today, states he is feeling well  States he has had a BM last night and today that were normal  Denies abdominal pain, nausea, vomiting, CP, SOB, fevers, chills, changes to bladder habits  Review of Systems   Constitutional: Negative for chills, diaphoresis and fever  Respiratory: Negative for cough, chest tightness and shortness of breath  Cardiovascular: Negative for chest pain  Gastrointestinal: Negative for abdominal distention, abdominal pain, anal bleeding, blood in stool, constipation, diarrhea, nausea and vomiting  Genitourinary: Negative for difficulty urinating, dysuria and hematuria  Neurological: Negative for dizziness and light-headedness     All other systems reviewed and are negative  Objective:     Blood pressure 121/76, pulse 93, temperature 98 5 °F (36 9 °C), temperature source Oral, resp  rate 16, height 5' 5" (1 651 m), weight 81 8 kg (180 lb 5 4 oz), SpO2 100 %  ,Body mass index is 30 01 kg/m²  Intake/Output Summary (Last 24 hours) at 12/12/2019 0834  Last data filed at 12/12/2019 0800  Gross per 24 hour   Intake 6660 81 ml   Output 1800 ml   Net 4860 81 ml       Invasive Devices     Peripheral Intravenous Line            Peripheral IV 12/10/19 Right Antecubital 1 day    Peripheral IV 12/10/19 Right Hand 1 day    Peripheral IV 12/11/19 Left Arm less than 1 day                Physical Exam   Constitutional: He is oriented to person, place, and time  He appears well-developed  No distress  thin   HENT:   Head: Normocephalic and atraumatic  Eyes: EOM are normal    Neck: Normal range of motion  Neck supple  Cardiovascular: Normal rate, regular rhythm and normal heart sounds  Exam reveals no gallop and no friction rub  No murmur heard  Pulmonary/Chest: Effort normal  No stridor  No respiratory distress  He has no wheezes  Abdominal: Soft  Bowel sounds are normal  He exhibits no distension and no mass  There is no tenderness  There is no guarding  Musculoskeletal: Normal range of motion  Neurological: He is alert and oriented to person, place, and time  Skin: Skin is warm and dry  He is not diaphoretic  Psychiatric: He has a normal mood and affect   His behavior is normal           Scheduled Meds:  Current Facility-Administered Medications:  atorvastatin 10 mg Oral Daily Anny Hassan PA-C    cefazolin 2,000 mg Intravenous Q8H Anny Hassan PA-C Last Rate: Stopped (12/12/19 0242)   heparin (porcine) 3-30 Units/kg/hr (Order-Specific) Intravenous Titrated Felipeidimarbenjamin I CONNER Hassan Last Rate: 13 Units/kg/hr (12/12/19 0420)   heparin (porcine) 3,200 Units Intravenous PRN Heidimarie I CONNER Hassan    heparin (porcine) 6,400 Units Intravenous PRN Anny Hassan PA-C    insulin glargine 30 Units Subcutaneous HS Paco Ford MD    insulin lispro 1-5 Units Subcutaneous HS Paco Ford MD    insulin lispro 1-5 Units Subcutaneous 0200 Paco Ford MD    insulin lispro 1-6 Units Subcutaneous TID AC Paco Ford MD    insulin lispro 10 Units Subcutaneous TID With Meals Paco Ford MD    metroNIDAZOLE 500 mg Intravenous Q8H Anny Hassan PA-C Last Rate: Stopped (12/12/19 0154)   multi-electrolyte 100 mL/hr Intravenous Continuous GENNARO Yanez Last Rate: 100 mL/hr (12/12/19 0003)   pantoprazole 40 mg Oral Early Morning Anny Hassan PA-C    potassium phosphate 30 mmol Intravenous Once Anny Hassan PA-C Last Rate: 30 mmol (12/12/19 0417)   potassium-sodium phosphates 1 packet Oral BID With Meals Anny Hassan PA-C      Continuous Infusions:  heparin (porcine) 3-30 Units/kg/hr (Order-Specific) Last Rate: 13 Units/kg/hr (12/12/19 0420)   multi-electrolyte 100 mL/hr Last Rate: 100 mL/hr (12/12/19 0003)     PRN Meds: heparin (porcine)    heparin (porcine)      Lab, Imaging and other studies:  I have personally reviewed pertinent lab results    , CBC:   Lab Results   Component Value Date    WBC 8 32 12/12/2019    HGB 8 0 (L) 12/12/2019    HCT 25 0 (L) 12/12/2019    MCV 80 (L) 12/12/2019     12/12/2019    MCH 25 6 (L) 12/12/2019    MCHC 32 0 12/12/2019    RDW 16 8 (H) 12/12/2019    MPV 9 8 12/12/2019    NRBC 0 12/12/2019   , CMP:   Lab Results   Component Value Date    SODIUM 140 12/12/2019    K 3 7 12/12/2019     (H) 12/12/2019    CO2 19 (L) 12/12/2019    BUN 5 12/12/2019    CREATININE 0 64 12/12/2019    CALCIUM 7 2 (L) 12/12/2019    EGFR 126 12/12/2019   , Coagulation: No results found for: PT, INR, APTT, Urinalysis: No results found for: COLORU, CLARITYU, SPECGRAV, PHUR, LEUKOCYTESUR, NITRITE, PROTEINUA, GLUCOSEU, KETONESU, BILIRUBINUR, BLOODU, Amylase: No results found for:  AMYLASE, Lipase: No results found for: LIPASE     VTE Pharmacologic Prophylaxis: Heparin  VTE Mechanical Prophylaxis: sequential compression device      Verónica Cast PA-C  12/12/2019 8:34 AM

## 2019-12-12 NOTE — PROGRESS NOTES
Daily Progress Note - Critical Care   Peggy  64 y o  male MRN: [id number]  Unit/Bed#: ICU 04 Encounter: [id number]        ----------------------------------------------------------------------------------------  HPI  63 yo with significant history of DM2 and hyperlipidemia underwent recent colon resection at [facility] 11/13 presented to [facility] ED with c/o SOB, LE weakness and swelling  Pt was found to be in DKA, R upper and lower lobe PE's, and an acute contained perforation proximal to surgical anastomosis  24hr events:   Pt's DKA resolved, transitioned to SubQ insulin  ECHO revealed normal heart function and no Right heart strain  Awaiting surgery eval and plan     ---------------------------------------------------------------------------------------  SUBJECTIVE  Pt reports the slept well and is feeling "great " States he walked around and went to the bathroom a few times overnight  Denied SOB, CP, palpitations, weakness  Legs feel about the same as yesterday, no worsening swelling or discomfort  Review of Systems  Review of systems was reviewed and negative unless stated above in HPI/24-hour events   ---------------------------------------------------------------------------------------  Assessment and Plan:    Neuro:    PAD  o Tylenol prn  o Sleep/wake cycle  o CAM-ICU       CV:    Sinus Tachycardia  o Secondary to pulmonary embolism  o No ECG changes concerning for RV strain  o Now resolved  o Cont to monitor telemetry      Pulm:   Acute respiratory insufficiency due to acute right sided pulmonary embolism  o Provoked in setting of recent surgery and inactivity  o 12/10 CT - Multiple acute pulmonary emboli are identified within the right upper and lower lobe including an embolus within the distal aspect of the right main pulmonary artery just proximal to its bifurcation   The calculated ratio of right ventricular to left ventricular diameter (RV/LV ratio) is 1 65    o 12/11 ECHO - EF 70%, RV function normal  o Cont Heparin gtt  - Awaiting plan from surgery regarding perforation   If anticipated prolonged intervention or repeat procedures would benefit from IVC filter  - Will need min 6 months anticoagulation   Transition to oral anticoagulation once surgery interventions completed    GI:    S/P Colon resection, Acute contained perforation  o See Hem/Onc plan below  o 12/11 CT - Acute contained perforation of the distal left colon and proximal sigmoid colon within the left paramedian lower abdomen and pelvis where there is a collection of extraluminal stool and free air, just proximal to a previous surgical anastomosis  o Surgery performed 11/13 by Dr Kingsley Newton  o Surgery eval and plan - pending  - Possible IR for drainage vs conservative mgmt with ABX vs lap  o Protonix daily  o Bowel regimen - not indicated at this time, pt had 'normal bm' yesterday, no current opioid use      :    ALIA  o Secondary to dehydration and DKA - now resolved post IVF resuscitation  o Strict I/O      F/E/N:    F - Iso @ 100/hr d/c, pt is tolerating po intake   E - replete as needed   N - CCH diet, will need to be NPO if surgery plans intervention      Heme/Onc:    B/L lower extremity DVT  o Likely provoked due to recent surgery and inactivity  o No personal or family history  o Cont AC as above   Adenoma carcinoma of the colon s/p resection   o Surgery performed 11/13 by Dr Kingsley Newton  o Surgery eval and plan - pending  - Possible IR for drainage  o No chemo or radiation indicated per PCP notes      Endo:    Type 2 Diabetes with DKA  o DKA now resolved  o Endocrine following  - Hgb A1C 8 7  o Cont Lantus and SSI      ID:    Leukocytosis with bandemia  o Secondary to sigmoid perf and DKA - now resolved  o Cont Ancef and Flagyl  - BC - no growth 24 hours  - Awaiting surgery plan      MSK/Skin:    No acute issues  o OOB to chair and ambulate today      Disposition: Transfer to [facility] with Telemetry   Code Status: Level 1 - Full Code  ---------------------------------------------------------------------------------------  ICU CORE MEASURES    Prophylaxis   VTE Pharmacologic Prophylaxis: Heparin Drip  VTE Mechanical Prophylaxis: reason for no mechanical VTE prophylaxis DVT b/l  Stress Ulcer Prophylaxis: Pantoprazole PO    ABCDE Protocol (if indicated)  Plan to perform spontaneous awakening trial today? Not applicable  Plan to perform spontaneous breathing trial today? Not applicable  Obvious barriers to extubation? Not applicable  CAM-ICU: Negative    Invasive Devices Review  Invasive Devices     Peripheral Intravenous Line            Peripheral IV 12/10/19 Right Antecubital 1 day    Peripheral IV 12/10/19 Right Hand 1 day    Peripheral IV 12/11/19 Left Arm less than 1 day              Can any invasive devices be discontinued today? Not applicable  ---------------------------------------------------------------------------------------  OBJECTIVE    Physical Exam   Constitutional: He is oriented to person, place, and time  He appears well-developed and well-nourished  No distress  Eyes: Pupils are equal, round, and reactive to light  Cardiovascular: Normal rate, regular rhythm, normal heart sounds and intact distal pulses  Exam reveals no gallop and no friction rub  No murmur heard  Pulmonary/Chest: Effort normal  No stridor  No respiratory distress  He has no wheezes  He has no rales  Abdominal: Soft  Bowel sounds are normal  He exhibits no distension  There is no tenderness  Musculoskeletal: He exhibits edema (Left > Right)  Neurological: He is alert and oriented to person, place, and time  Skin: Skin is warm and dry  Capillary refill takes less than 2 seconds  Psychiatric: He has a normal mood and affect         Vitals   Vitals:    12/12/19 0330 12/12/19 0430 12/12/19 0530 12/12/19 0600   BP: 116/70 120/69 113/66    BP Location:       Pulse: 89 85 90    Resp: 15 17 16    Temp:       TempSrc:       SpO2: 94% 95% 94%    Weight:    81 8 kg (180 lb 5 4 oz)   Height:         Temp (24hrs), Av 3 °F (36 8 °C), Min:97 6 °F (36 4 °C), Max:98 6 °F (37 °C)  Current: Temperature: 98 5 °F (36 9 °C)  HR: 90  BP: 113/66  RR: 18  SpO2: 94% on RA    Invasive/non-invasive ventilation settings   Respiratory    Lab Data (Last 4 hours)    None         O2/Vent Data (Last 4 hours)    None                Height and Weights   Height: 5' 5" (165 1 cm)  IBW: 61 5 kg  Body mass index is 30 01 kg/m²  Weight (last 2 days)     Date/Time   Weight    19 0600   81 8 (180 34)    19 0539   73 6 (162 26)    12/10/19 2249   73 6 (162 26)    12/10/19 2000   78 (172)                Intake and Output  I/O       12/10 07 -  0700  07 -  0700    P  O   420    I V  (mL/kg) 3594 5 (48 8) 5482 4 (67)    IV Piggyback 2500 1000    Total Intake(mL/kg) 6094 5 (82 8) 6902 4 (84 4)    Urine (mL/kg/hr) 425 1500 (0 8)    Stool  0    Total Output 425 1500    Net +5669 5 +5402 4          Unmeasured Urine Occurrence  1 x    Unmeasured Stool Occurrence  2 x        UOP: 1,500 24/hr +1 unmeasured    Nutrition       Diet Orders   (From admission, onward)             Start     Ordered    19 1638  Diet Mauricio/CHO Controlled; Consistent Carbohydrate Diet Level 2 (5 carb servings/75 grams CHO/meal)  Diet effective now     Question Answer Comment   Diet Type Mauricio/CHO Controlled    Mauricio/CHO Controlled Consistent Carbohydrate Diet Level 2 (5 carb servings/75 grams CHO/meal)    RD to adjust diet per protocol?  Yes        19 1637    12/10/19 2255  Room Service  Once     Question:  Type of Service  Answer:  Room Service - Appropriate with Assistance    12/10/19 225                  Laboratory and Diagnostics:  Results from last 7 days   Lab Units 19  0311 19  0938 12/10/19  1850   WBC Thousand/uL 8 32 8 14 11 61*   HEMOGLOBIN g/dL 8 0* 9 2* 11 5*   HEMATOCRIT % 25 0* 28 2* 36 2* PLATELETS Thousands/uL 263 262 286   NEUTROS PCT % 67  --   --    BANDS PCT %  --  10* 10*   MONOS PCT % 14*  --   --    MONO PCT %  --  8 9     Results from last 7 days   Lab Units 12/12/19 0311 12/11/19  1425 12/11/19  0938 12/11/19  0450 12/10/19  2258 12/10/19  1850   SODIUM mmol/L 140 139 138 137 131* 127*   POTASSIUM mmol/L 3 7 3 6 3 7 3 4* 4 1 5 0   CHLORIDE mmol/L 110* 111* 109* 109* 97* 89*   CO2 mmol/L 19* 19* 20* 16* 10* 12*   ANION GAP mmol/L 11 9 9 12 24* 26*   BUN mg/dL 5 8 11 14 19 21   CREATININE mg/dL 0 64 0 86 0 83 0 98 1 29 1 44*   CALCIUM mg/dL 7 2* 7 1* 7 1* 7 5* 8 4 9 2   GLUCOSE RANDOM mg/dL 183* 144* 196* 209* 317* 429*   ALT U/L  --   --   --   --   --  20   AST U/L  --   --   --   --   --  17   ALK PHOS U/L  --   --   --   --   --  188*   ALBUMIN g/dL  --   --   --   --   --  1 9*   TOTAL BILIRUBIN mg/dL  --   --   --   --   --  0 79     Results from last 7 days   Lab Units 12/12/19 0311 12/11/19  1425 12/11/19 0938 12/11/19 0450 12/10/19  2258   MAGNESIUM mg/dL 1 8 2 1 2 1 1 8 2 1   PHOSPHORUS mg/dL 1 6* 2 2* 2 1* 1 9* 2 1*      Results from last 7 days   Lab Units 12/12/19 0311 12/11/19  1957 12/11/19  1227 12/11/19 0450 12/10/19  2047   INR   --   --   --   --  1 29*   PTT seconds 108* 57* 93* 175* 24      Results from last 7 days   Lab Units 12/11/19  0450 12/11/19  0137 12/10/19  2258 12/10/19  1850   TROPONIN I ng/mL 0 02 0 02 <0 02 <0 02     Results from last 7 days   Lab Units 12/11/19  0450 12/10/19  2008   LACTIC ACID mmol/L 1 2 2 2*     ABG:    VBG:  Results from last 7 days   Lab Units 12/10/19  1950   PH HONORIO  7 290*   PCO2 HONORIO mm Hg 25 4*   PO2 HONORIO mm Hg 30 1*   HCO3 HONORIO mmol/L 11 9*   BASE EXC HONORIO mmol/L -13 0           Micro  Results from last 7 days   Lab Units 12/10/19  2016 12/10/19  2008   BLOOD CULTURE  No Growth at 24 hrs  No Growth at 24 hrs  EKG: NSR  Imaging: No new imaging I have personally reviewed pertinent reports        Active Medications  Scheduled Meds:    Current Facility-Administered Medications:  atorvastatin 10 mg Oral Daily Anny Hassan PA-C    cefazolin 2,000 mg Intravenous Q8H Anny Hassan PA-C Last Rate: Stopped (12/12/19 0242)   heparin (porcine) 3-30 Units/kg/hr (Order-Specific) Intravenous Titrated Anny Hassan PA-C Last Rate: 13 Units/kg/hr (12/12/19 0420)   heparin (porcine) 3,200 Units Intravenous PRN Anny Hassan PA-C    heparin (porcine) 6,400 Units Intravenous PRN Anny Hassan PA-C    insulin glargine 30 Units Subcutaneous HS Shawn Cardoso MD    insulin lispro 1-5 Units Subcutaneous HS Shawn Cardoso MD    insulin lispro 1-5 Units Subcutaneous 0200 Shawn Cardoso MD    insulin lispro 1-6 Units Subcutaneous TID AC Shawn Cardoso MD    insulin lispro 10 Units Subcutaneous TID With Meals Shawn Cardoso MD    metroNIDAZOLE 500 mg Intravenous Q8H Anny Hassan PA-C Last Rate: Stopped (12/12/19 0154)   multi-electrolyte 100 mL/hr Intravenous Continuous GENNARO Yanez Last Rate: 100 mL/hr (12/12/19 0003)   pantoprazole 40 mg Oral Early Morning Anny Hassan PA-C    potassium phosphate 30 mmol Intravenous Once Anny Hassan PA-C Last Rate: 30 mmol (12/12/19 0417)   potassium-sodium phosphates 1 packet Oral BID With Meals Anny Hassan PA-C      Continuous Infusions:    heparin (porcine) 3-30 Units/kg/hr (Order-Specific) Last Rate: 13 Units/kg/hr (12/12/19 0420)   multi-electrolyte 100 mL/hr Last Rate: 100 mL/hr (12/12/19 0003)     PRN Meds:     heparin (porcine) 3,200 Units PRN   heparin (porcine) 6,400 Units PRN       Allergies   No Known Allergies    Advance Directive and Living Will:      Power of :    POLST:        Counseling / Coordination of Care        GENNARO Yanez        Portions of the record may have been created with voice recognition software    Occasional wrong word or "sound a like" substitutions may have occurred due to the inherent limitations of voice recognition software    Read the chart carefully and recognize, using context, where substitutions have occurred

## 2019-12-12 NOTE — PROGRESS NOTES
Progress Note - Lenoard Adam 1960, 64 y o  male MRN: [id number]    Unit/Bed#: ICU 04 Encounter: [id number]    Primary Care Provider: Maria De Jesus Andrade MD   Date and time admitted to hospital: 12/10/2019  6:11 PM        * Acute pulmonary embolism without acute cor pulmonale [facility])  Assessment & Plan  ? Provoked in setting of recent surgery and inactivity  ? 12/10 CT - Multiple acute pulmonary emboli are identified within the right upper and lower lobe including an embolus within the distal aspect of the right main pulmonary artery just proximal to its bifurcation  The calculated ratio of right ventricular to left ventricular diameter (RV/LV ratio) is 1 65    ? 12/11 ECHO - EF 70%, RV function normal  ? Cont Heparin gtt  § Awaiting plan from surgery regarding perforation  § If anticipated prolonged intervention or repeat procedures would benefit from IVC filter  § Will need min 6 months anticoagulation  § Transition to oral anticoagulation once surgery interventions completed    Acute deep vein thrombosis (DVT) of lower extremity ([address] )  Assessment & Plan  · See plan above for PE    Perforated sigmoid colon [facility])  Assessment & Plan  ? 12/11 CT - Acute contained perforation of the distal left colon and proximal sigmoid colon within the left paramedian lower abdomen and pelvis where there is a collection of extraluminal stool and free air, just proximal to a previous surgical anastomosis  ? Surgery performed 11/13 by Dr Constantine Gomes  ? Surgery eval and plan - pending  ? Possible IR for drainage vs conservative mgmt with ABX vs lap  ? Protonix daily  ?  Bowel regimen - not indicated at this time, pt had 'normal bm' yesterday, no current opioid use    Diabetic ketoacidosis without coma associated with type 2 diabetes mellitus [facility])  Assessment & Plan  Lab Results   Component Value Date    HGBA1C 8 7 (H) 12/12/2019       Recent Labs     12/11/19  2204 12/12/19  0005 12/12/19  0222 12/12/19  0737   POCGLU 254* 196* 188* 199*       Blood Sugar Average: Last 72 hrs:  (P) 201 375     ? DKA now resolved  ? Endocrine following  § Hgb A1C 8 7  ? Cont Lantus and SSI    Anemia  Assessment & Plan  · Admission Hgb 11 5  · Current Hgb 8 0  · Repeat Hgb this afternoon  · No active signs of bleeding on Heparin infusion, likely dilutional post IVF resuscitation     Hypoalbuminemia due to protein-calorie malnutrition [facility])  Assessment & Plan  Malnutrition Findings:           BMI Findings: Body mass index is 30 01 kg/m²  · Secondary to poor nutrition post colon resection  · Intake improving, good appetite now  · May benefit from Ensure or protein supplementation    ALIA (acute kidney injury) [facility])  Assessment & Plan  ? Secondary to dehydration and DKA - now resolved post IVF resuscitation  ?  Strict I/O        Code Status: Level 1 - Full Code  POA:    POLST:      Reason for ICU admission:   DKA, Acute PE, Perforated sigmoid colon    Active problems:   Principal Problem:    Acute pulmonary embolism without acute cor pulmonale (HCC)  Active Problems:    Acute deep vein thrombosis (DVT) of lower extremity (HCC)    Perforated sigmoid colon (HCC)    Diabetic ketoacidosis without coma associated with type 2 diabetes mellitus (HCC)    ALIA (acute kidney injury) ([facility] [address] )    Hypoalbuminemia due to protein-calorie malnutrition (HCC)    Anemia  Resolved Problems:    Bandemia    Lactic acidosis    Increased anion gap metabolic acidosis    Acute respiratory insufficiency      Consultants:   General Surgery  Endocrinology    History of Present Illness:   Mando HAIDER  "Jeanne Menendez is a 64 y o  male with a past medical history of type 2 diabetes hyperlipidemia status post colon resection at [facility] 11/13/2019 by Dr Luke Deal, he was in the hospital for approximately 4 days was discharged and approximately 2 weeks ago he started feeling more weak had significant shortness of breath on exertion feels lightheaded had left leg swelling with associated pain under ability to eat had 1 episode of vomiting chills and nausea on loose stool  He stated he lost approximately 20 lb over the last month  He stated he is fine when he lays down but this when he gets up and walks a short distance he is severely winded denies any chest pain or abdominal pain  Was last seen by his primary care doctor on 12/05/2019  Per primary care's note patient was seen in the ER before that and his is follow-up visit after his ER visit he had an elevated white count decreased platelets and was in sinus tachycardia his sugar at that point also was 474  His hemoglobin A1c was 9 7 in September  Per Oncology does not need chemotherapy or radiation did surgery for the cancerous polyp was cured  The primary care note stated no shortness of breath  Dr Torrey Mcintosh was contacted by the ER patient to be admitted to ICU general surgery to be consult will evaluate patient in the a m "    Summary of clinical course:   Pt was found to be in DKA, R upper and lower lobe PE's, and an acute contained perforation proximal to surgical anastomosis  DKA protocol initiated as well as Heparin infusion  Pt's DKA resolved, transitioned to SubQ insulin  ECHO revealed normal heart function and no Right heart strain  Pt remained HD stable  Awaiting surgery eval and plan  Recent or scheduled procedures:   none    Outstanding/pending diagnostics:   none    Cultures:   none       Mobilization Plan:   OOB to chair    Nutrition Plan:   Constant Carb diet    VTE Pharmacologic Prophylaxis: Heparin Drip  VTE Mechanical Prophylaxis: sequential compression device    Discharge Plan:       Initial /Plan: n/a    Home medications that are not reordered and reason why:   Jardiance, Toukeo, Metformin - receiving Lantus and Humalog      Spoke with Isaiah Chambers  regarding transfer  Please call [phone] with any questions or concerns       Portions of the record may have been created with voice recognition software  Occasional wrong word or "sound a like" substitutions may have occurred due to the inherent limitations of voice recognition software  Read the chart carefully and recognize, using context, where substitutions have occurred

## 2019-12-13 VITALS
HEIGHT: 65 IN | WEIGHT: 180.34 LBS | BODY MASS INDEX: 30.05 KG/M2 | SYSTOLIC BLOOD PRESSURE: 128 MMHG | OXYGEN SATURATION: 97 % | TEMPERATURE: 97.8 F | DIASTOLIC BLOOD PRESSURE: 74 MMHG | HEART RATE: 107 BPM | RESPIRATION RATE: 14 BRPM

## 2019-12-13 LAB
ANION GAP SERPL CALCULATED.3IONS-SCNC: 7 MMOL/L (ref 4–13)
APTT PPP: 82 SECONDS (ref 23–37)
BUN SERPL-MCNC: 3 MG/DL (ref 5–25)
CA-I BLD-SCNC: 1.11 MMOL/L (ref 1.12–1.32)
CALCIUM SERPL-MCNC: 7.3 MG/DL (ref 8.3–10.1)
CHLORIDE SERPL-SCNC: 107 MMOL/L (ref 100–108)
CO2 SERPL-SCNC: 23 MMOL/L (ref 21–32)
CREAT SERPL-MCNC: 0.63 MG/DL (ref 0.6–1.3)
ERYTHROCYTE [DISTWIDTH] IN BLOOD BY AUTOMATED COUNT: 16.6 % (ref 11.6–15.1)
GFR SERPL CREATININE-BSD FRML MDRD: 126 ML/MIN/1.73SQ M
GLUCOSE SERPL-MCNC: 102 MG/DL (ref 65–140)
GLUCOSE SERPL-MCNC: 123 MG/DL (ref 65–140)
GLUCOSE SERPL-MCNC: 93 MG/DL (ref 65–140)
GLUCOSE SERPL-MCNC: 93 MG/DL (ref 65–140)
HCT VFR BLD AUTO: 24.9 % (ref 36.5–49.3)
HGB BLD-MCNC: 8.3 G/DL (ref 12–17)
MAGNESIUM SERPL-MCNC: 1.8 MG/DL (ref 1.6–2.6)
MCH RBC QN AUTO: 25.8 PG (ref 26.8–34.3)
MCHC RBC AUTO-ENTMCNC: 33.3 G/DL (ref 31.4–37.4)
MCV RBC AUTO: 77 FL (ref 82–98)
PHOSPHATE SERPL-MCNC: 2 MG/DL (ref 2.7–4.5)
PLATELET # BLD AUTO: 300 THOUSANDS/UL (ref 149–390)
PMV BLD AUTO: 9.9 FL (ref 8.9–12.7)
POTASSIUM SERPL-SCNC: 3.4 MMOL/L (ref 3.5–5.3)
RBC # BLD AUTO: 3.22 MILLION/UL (ref 3.88–5.62)
SODIUM SERPL-SCNC: 137 MMOL/L (ref 136–145)
WBC # BLD AUTO: 8.38 THOUSAND/UL (ref 4.31–10.16)

## 2019-12-13 PROCEDURE — 85730 THROMBOPLASTIN TIME PARTIAL: CPT | Performed by: INTERNAL MEDICINE

## 2019-12-13 PROCEDURE — 83735 ASSAY OF MAGNESIUM: CPT | Performed by: INTERNAL MEDICINE

## 2019-12-13 PROCEDURE — 99232 SBSQ HOSP IP/OBS MODERATE 35: CPT | Performed by: INTERNAL MEDICINE

## 2019-12-13 PROCEDURE — 80048 BASIC METABOLIC PNL TOTAL CA: CPT | Performed by: INTERNAL MEDICINE

## 2019-12-13 PROCEDURE — 82330 ASSAY OF CALCIUM: CPT | Performed by: INTERNAL MEDICINE

## 2019-12-13 PROCEDURE — 85027 COMPLETE CBC AUTOMATED: CPT | Performed by: INTERNAL MEDICINE

## 2019-12-13 PROCEDURE — 84100 ASSAY OF PHOSPHORUS: CPT | Performed by: INTERNAL MEDICINE

## 2019-12-13 PROCEDURE — 99239 HOSP IP/OBS DSCHRG MGMT >30: CPT | Performed by: INTERNAL MEDICINE

## 2019-12-13 PROCEDURE — 82948 REAGENT STRIP/BLOOD GLUCOSE: CPT

## 2019-12-13 RX ORDER — MAGNESIUM SULFATE 1 G/100ML
1 INJECTION INTRAVENOUS ONCE
Status: COMPLETED | OUTPATIENT
Start: 2019-12-13 | End: 2019-12-13

## 2019-12-13 RX ORDER — INSULIN LISPRO 100 [IU]/ML
10 INJECTION, SOLUTION INTRAVENOUS; SUBCUTANEOUS
Qty: 5 PEN | Refills: 0 | Status: SHIPPED | OUTPATIENT
Start: 2019-12-13 | End: 2019-12-13 | Stop reason: HOSPADM

## 2019-12-13 RX ORDER — POTASSIUM CHLORIDE 20 MEQ/1
40 TABLET, EXTENDED RELEASE ORAL ONCE
Status: COMPLETED | OUTPATIENT
Start: 2019-12-13 | End: 2019-12-13

## 2019-12-13 RX ADMIN — CEFAZOLIN SODIUM 2000 MG: 2 SOLUTION INTRAVENOUS at 10:14

## 2019-12-13 RX ADMIN — MAGNESIUM SULFATE HEPTAHYDRATE 1 G: 1 INJECTION, SOLUTION INTRAVENOUS at 06:13

## 2019-12-13 RX ADMIN — POTASSIUM & SODIUM PHOSPHATES POWDER PACK 280-160-250 MG 1 PACKET: 280-160-250 PACK at 16:17

## 2019-12-13 RX ADMIN — PANTOPRAZOLE SODIUM 40 MG: 40 TABLET, DELAYED RELEASE ORAL at 06:12

## 2019-12-13 RX ADMIN — POTASSIUM & SODIUM PHOSPHATES POWDER PACK 280-160-250 MG 1 PACKET: 280-160-250 PACK at 08:22

## 2019-12-13 RX ADMIN — METRONIDAZOLE 500 MG: 500 INJECTION, SOLUTION INTRAVENOUS at 10:48

## 2019-12-13 RX ADMIN — POTASSIUM PHOSPHATE, MONOBASIC AND POTASSIUM PHOSPHATE, DIBASIC 12 MMOL: 224; 236 INJECTION, SOLUTION, CONCENTRATE INTRAVENOUS at 13:13

## 2019-12-13 RX ADMIN — ATORVASTATIN CALCIUM 10 MG: 10 TABLET, FILM COATED ORAL at 08:22

## 2019-12-13 RX ADMIN — POTASSIUM CHLORIDE 40 MEQ: 1500 TABLET, EXTENDED RELEASE ORAL at 06:12

## 2019-12-13 RX ADMIN — APIXABAN 10 MG: 5 TABLET, FILM COATED ORAL at 11:52

## 2019-12-13 RX ADMIN — METRONIDAZOLE 500 MG: 500 INJECTION, SOLUTION INTRAVENOUS at 01:09

## 2019-12-13 RX ADMIN — CEFAZOLIN SODIUM 2000 MG: 2 SOLUTION INTRAVENOUS at 01:09

## 2019-12-13 NOTE — PLAN OF CARE
Problem: Prexisting or High Potential for Compromised Skin Integrity  Goal: Skin integrity is maintained or improved  Description  INTERVENTIONS:  - Identify patients at risk for skin breakdown  - Assess and monitor skin integrity  - Assess and monitor nutrition and hydration status  - Monitor labs   - Assess for incontinence   - Turn and reposition patient  - Assist with mobility/ambulation  - Relieve pressure over bony prominences  - Avoid friction and shearing  - Provide appropriate hygiene as needed including keeping skin clean and dry  - Evaluate need for skin moisturizer/barrier cream  - Collaborate with interdisciplinary team   - Patient/family teaching  - Consider wound care consult   Outcome: Progressing     Problem: Potential for Falls  Goal: Patient will remain free of falls  Description  INTERVENTIONS:  - Assess patient frequently for physical needs  -  Identify cognitive and physical deficits and behaviors that affect risk of falls  -  [place] fall precautions as indicated by assessment   - Educate patient/family on patient safety including physical limitations  - Instruct patient to call for assistance with activity based on assessment  - Modify environment to reduce risk of injury  - Consider OT/PT consult to assist with strengthening/mobility  Outcome: Progressing     Problem: Nutrition/Hydration-ADULT  Goal: Nutrient/Hydration intake appropriate for improving, restoring or maintaining nutritional needs  Description  Monitor and assess patient's nutrition/hydration status for malnutrition  Collaborate with interdisciplinary team and initiate plan and interventions as ordered  Monitor patient's weight and dietary intake as ordered or per policy  Utilize nutrition screening tool and intervene as necessary  Determine patient's food preferences and provide high-protein, high-caloric foods as appropriate       INTERVENTIONS:  - Monitor oral intake, urinary output, labs, and treatment plans  - Assess nutrition and hydration status and recommend course of action  - Evaluate amount of meals eaten  - Assist patient with eating if necessary   - Allow adequate time for meals  - Recommend/ encourage appropriate diets, oral nutritional supplements, and vitamin/mineral supplements  - Order, calculate, and assess calorie counts as needed  - Recommend, monitor, and adjust tube feedings and TPN/PPN based on assessed needs  - Assess need for intravenous fluids  - Provide specific nutrition/hydration education as appropriate  - Include patient/family/caregiver in decisions related to nutrition  Outcome: Progressing     Problem: PAIN - ADULT  Goal: Verbalizes/displays adequate comfort level or baseline comfort level  Description  Interventions:  - Encourage patient to monitor pain and request assistance  - Assess pain using appropriate pain scale  - Administer analgesics based on type and severity of pain and evaluate response  - Implement non-pharmacological measures as appropriate and evaluate response  - Consider cultural and social influences on pain and pain management  - Notify physician/advanced practitioner if interventions unsuccessful or patient reports new pain  Outcome: Progressing     Problem: INFECTION - ADULT  Goal: Absence or prevention of progression during hospitalization  Description  INTERVENTIONS:  - Assess and monitor for signs and symptoms of infection  - Monitor lab/diagnostic results  - Monitor all insertion sites, i e  indwelling lines, tubes, and drains  - Monitor endotracheal if appropriate and nasal secretions for changes in amount and color  - [place] appropriate cooling/warming therapies per order  - Administer medications as ordered  - Instruct and encourage patient and family to use good hand hygiene technique  - Identify and instruct in appropriate isolation precautions for identified infection/condition  Outcome: Progressing  Goal: Absence of fever/infection during neutropenic period  Description  INTERVENTIONS:  - Monitor WBC    Outcome: Progressing     Problem: SAFETY ADULT  Goal: Patient will remain free of falls  Description  INTERVENTIONS:  - Assess patient frequently for physical needs  -  Identify cognitive and physical deficits and behaviors that affect risk of falls    -  [place] fall precautions as indicated by assessment   - Educate patient/family on patient safety including physical limitations  - Instruct patient to call for assistance with activity based on assessment  - Modify environment to reduce risk of injury  - Consider OT/PT consult to assist with strengthening/mobility  Outcome: Progressing  Goal: Maintain or return to baseline ADL function  Description  INTERVENTIONS:  -  Assess patient's ability to carry out ADLs; assess patient's baseline for ADL function and identify physical deficits which impact ability to perform ADLs (bathing, care of mouth/teeth, toileting, grooming, dressing, etc )  - Assess/evaluate cause of self-care deficits   - Assess range of motion  - Assess patient's mobility; develop plan if impaired  - Assess patient's need for assistive devices and provide as appropriate  - Encourage maximum independence but intervene and supervise when necessary  - Involve family in performance of ADLs  - Assess for home care needs following discharge   - Consider OT consult to assist with ADL evaluation and planning for discharge  - Provide patient education as appropriate  Outcome: Progressing  Goal: Maintain or return mobility status to optimal level  Description  INTERVENTIONS:  - Assess patient's baseline mobility status (ambulation, transfers, stairs, etc )    - Identify cognitive and physical deficits and behaviors that affect mobility  - Identify mobility aids required to assist with transfers and/or ambulation (gait belt, sit-to-stand, lift, walker, cane, etc )  - [place] fall precautions as indicated by assessment  - Record patient progress and toleration of activity level on Mobility SBAR; progress patient to next Phase/Stage  - Instruct patient to call for assistance with activity based on assessment  - Consider rehabilitation consult to assist with strengthening/weightbearing, etc   Outcome: Progressing

## 2019-12-13 NOTE — NURSING NOTE
Pt discharged and walked out accompanied by son  Discharge instructions reviewed and given to pt  Pt had no questions or concerns

## 2019-12-13 NOTE — SOCIAL WORK
Per RAKEL, pt is medically stable for d/c home today  CM rec'd call from [facility] who reports they have pt's Eliquis $0 copay for today as well as Humalog Kwikpen $100 copay  Per Dr Jay Canseco, Humalog is new start  Cm provided [employer] with  coupon for [employer] but per Jordy Zayas at [facility], coupon not valid for pt's particular dosage  Dr Jay Canseco spoke with [facility] for alternate Rx options  Per Jordy Zayas at [facility], generic Humalog is available for $26 28 copay  CM discussed with pt who reports he can afford the copays for both Eliquis and Humalog  Pt also showed CM he has the $10 copay Eliquis discount card for those refills  Pt reports he does not have his wallet or any form of payment today, so he will take the Eliquis home tonight and then come back to pharmacy tomorrow to  the Humalog  CM spoke with Jordy Zayas who reports Eliquis will be delivered to room and she's aware pt will  humalog tomorrow  Pt is aware pharmacy is open Saturday 9am-3pm  CM discussed with Dr Jay Canseco who is in agreement with plan and pt will get his Humalog before d/c today  Pt reports family will transport home this evening  No other CM needs identified for d/c home today

## 2019-12-13 NOTE — DISCHARGE SUMMARY
Discharge Summary - [address] Internal Medicine    Patient Information: Jeanne Menendez 64 y o  male MRN: [id number]  Unit/Bed#: ICU 04 Encounter: [id number]    Discharging Physician / Practitioner: Kasey Lyman MD  PCP: Catarino Nathan MD  Admission Date: 12/10/2019  Discharge Date: 12/13/19    Disposition:     Home    Reason for Admission: weakness    Discharge Diagnoses:       Acute pulmonary embolism without acute cor pulmonale ([facility] )    Diabetic ketoacidosis without coma associated with type 2 diabetes mellitus ([facility] )    ALIA (acute kidney injury) ([place] [address] )    Hypoalbuminemia due to protein-calorie malnutrition ([place] [address] )     Acute deep vein thrombosis (DVT) of lower extremity ([place] [address] )       Consultations During Hospital Stay:  · General surgery  · Endocrinology  · Critical care      Significant Findings / Test Results:     · Venous Doppler shows acute occlusive DVT in the paired posterior tibial veins, peroneal veins and soleal vein  Acute occlusive DVT in the external iliac vein, common femoral vein, deep femoral vein, proximal to distal femoral vein  popliteal and gastrocnemius veins, paired posterior tibial and peroneal veins, and soleal veins  · CTA chest showed Multiple acute pulmonary emboli are identified within the right upper and lower lobe including an embolus within the distal aspect of the right main pulmonary artery just proximal to its bifurcation  Acute contained perforation of the distal left colon and proximal sigmoid colon within the left paramedian lower abdomen and pelvis where there is a collection of extraluminal stool and free air, just proximal to a previous surgical anastomosis  · Echocardiogram showed EF of 70 percent    Normal right ventricular systolic function      Hospital Course:     Jeanne Menendez is a 64 y o  male patient with PMHx of  type 2 diabetes, hyperlipidemia status post colon resection [facility] on 11/13 who originally presented to the hospital on 12/10/2019 due to dyspnea, dizziness and left leg swelling associated with pain  CTA chest showed multiple PE  Doppler studies positive for bilateral lower extremity DVT  Fortunately he is hemodynamically stable  Echocardiogram does not show signs of RV strain  He will be transitioned from heparin drip to Eliquis for anticoagulation  Initial CTA also showed findings of possible contained perforation of the distal left abdomen and proximal sigmoid colon  He was closely followed by General surgery and was seen by Dr Torrey Mcintosh  It was felt that patient does not have clinical evidence of perforation  His abdomen exam is benign  He is tolerating regular diet with no complaints  Patient was seen by endocrinologist and his insulin doses were adjusted  He will be discharged home with improved condition  Discharge Day Visit / Exam:     Subjective:  Denies chest pain and dyspnea  O2 sat is stable on room air  Vitals: Blood Pressure: 106/68 (12/13/19 1100)  Pulse: 104 (12/13/19 1100)  Temperature: 98 5 °F (36 9 °C) (12/13/19 1100)  Temp Source: Oral (12/13/19 1100)  Respirations: 20 (12/13/19 1100)  Height: 5' 5" (165 1 cm) (12/11/19 0915)  Weight - Scale: 81 8 kg (180 lb 5 4 oz) (12/13/19 0600)  SpO2: 97 % (12/13/19 1100)  Exam:   Physical Exam   Constitutional: He is oriented to person, place, and time  No distress  Cardiovascular: Normal rate and regular rhythm  Pulmonary/Chest: Effort normal  No respiratory distress  He has no wheezes  Abdominal: Soft  There is no tenderness  Musculoskeletal: He exhibits edema  Neurological: He is alert and oriented to person, place, and time  Skin: Skin is warm  He is not diaphoretic  Psychiatric: He has a normal mood and affect  Discharge instructions/Information to patient and family:   See after visit summary for information provided to patient and family        Provisions for Follow-Up Care:  See after visit summary for information related to follow-up care and any pertinent home health orders  Planned Readmission: No     Discharge Statement:  I spent 30  minutes discharging the patient  This time was spent on the day of discharge  I had direct contact with the patient on the day of discharge  Greater than 50% of the total time was spent examining patient, answering all patient questions, arranging and discussing plan of care with patient as well as directly providing post-discharge instructions  Additional time then spent on discharge activities  Discharge Medications:  See after visit summary for reconciled discharge medications provided to patient and family        ** Please Note: This note has been constructed using a voice recognition system **

## 2019-12-13 NOTE — PROGRESS NOTES
Progress Note - Pulmonary   Peggy  64 y o  male MRN: [id number]  Unit/Bed#: ICU 04 Encounter: [id number]    Assessment/Plan:    1  Acute pulmonary insufficiency likely secondary to PE      *  patient remains on room air 98%- patient was not previously on home O2 therapy       *  no indication for oxygen therapy at this time       *  initiate pulmonary toilet-  please discharge patient with IS Q 1 hr, OOB as tolerated, deep breathing cough    2  Acute RUL & RLL submassive PE without cor pulmonale      *  patient had recent abdominal surgery at [facility] on 11/13/19- this is likely the cause of patient DVT/PE      *  patient was initiated on heparin drip- transition to Eliquis      *  patient will begin Eliquis 10 mg b i d  X 7 daay, then 5 mg b i d       *  minimum therapy 3-6 months-  given patient's history of DVT and PE may need to consider lifelong anticoagulation therapy    3  Perforated sigmoid colon      *  surgery performed by Dr Farnaz Peck at [facility]      *  no indication at this time for any surgical intervention    - outpatient follow-up per discharge    Chief Complaint:    "I am so grateful to be alive"    Subjective:    Caitlin was comfortably sitting in his bed  He reports that his respiratory status and dyspnea has significantly improved since admission  He states that he is very grateful that he came into the hospital when he did otherwise he fears his prognosis would of been fatal   No significant overnight events reported  Patient currently denies any fever, chills, hemoptysis, headaches, night sweats, pleuritic chest pain, or palpitations  Objective:    Vitals: Blood pressure 128/74, pulse (!) 107, temperature 97 8 °F (36 6 °C), temperature source Oral, resp  rate 14, height 5' 5" (1 651 m), weight 81 8 kg (180 lb 5 4 oz), SpO2 97 %  RA,Body mass index is 30 01 kg/m²        Intake/Output Summary (Last 24 hours) at 12/13/2019 1542  Last data filed at 12/13/2019 0700  Gross per 24 hour   Intake 300 ml   Output 950 ml   Net -650 ml       Invasive Devices     Peripheral Intravenous Line            Peripheral IV 12/11/19 Left Arm 2 days                Physical Exam:  Physical Exam   Constitutional: He is oriented to person, place, and time  He appears well-developed and well-nourished  No distress  HENT:   Head: Normocephalic and atraumatic  Neck: Normal range of motion  Neck supple  No tracheal deviation present  No thyromegaly present  Cardiovascular: Normal rate, regular rhythm and normal heart sounds  Exam reveals no gallop and no friction rub  No murmur heard  Pulmonary/Chest: No accessory muscle usage or stridor  No tachypnea and no bradypnea  No respiratory distress  He has no decreased breath sounds  He has no wheezes  He has no rhonchi  He has no rales  He exhibits no tenderness  Abdominal: Soft  Bowel sounds are normal  He exhibits no distension  There is no tenderness  Musculoskeletal: Normal range of motion  He exhibits no edema or deformity  Neurological: He is alert and oriented to person, place, and time  Skin: Skin is warm and dry  No rash noted  He is not diaphoretic  No erythema  Psychiatric: He has a normal mood and affect  His behavior is normal        Labs: I have personally reviewed pertinent lab results  , BNP: No results found for: BNP, CBC:   Lab Results   Component Value Date    WBC 8 38 12/13/2019    HGB 8 3 (L) 12/13/2019    HCT 24 9 (L) 12/13/2019    MCV 77 (L) 12/13/2019     12/13/2019    MCH 25 8 (L) 12/13/2019    MCHC 33 3 12/13/2019    RDW 16 6 (H) 12/13/2019    MPV 9 9 12/13/2019   , CMP:   Lab Results   Component Value Date    SODIUM 137 12/13/2019    K 3 4 (L) 12/13/2019     12/13/2019    CO2 23 12/13/2019    BUN 3 (L) 12/13/2019    CREATININE 0 63 12/13/2019    CALCIUM 7 3 (L) 12/13/2019    EGFR 126 12/13/2019       Imaging and other studies: I have personally reviewed pertinent films in PACS     CTA 12/10/2019-  ACUTE PE IN RIGHT UPPER AND LOWER LOBE RV/LV DYSFUNCTION 1 65

## 2019-12-13 NOTE — PROGRESS NOTES
Progress Note - General Surgery   Tawanda Ayala 64 y o  male MRN: [id number]  Unit/Bed#: ICU 04 Encounter: [id number]    Assessment:  42-year-old with acute PE, DKA, ALIA and status post recent colon resection for cancer follow-up on 11/13 by Dr Terry Renae  Plan:  1  Acute contained perforation of the distal left colon and proximal sigmoid colon on 12/11 CT  - Per Dr Terry Renae, does not believe there is a perforation, no need for IR drainage or surgery at this time  - abd exam is negative  - afebrile, VSS, no leukocytosis  - IVF  - f/u in 2 weeks with Dr Terry Renae in outpatient office  - ok to d/c from surgery standpoint  No need for abx after d/c per Dr Terry Renae  - will leave to primary medicine team, sign off    Subjective/Objective   Chief Complaint:  No acute complaints today    Subjective:  Patient is a 42-year-old with acute PE, DKA, ALIA and status post recent colon resection for cancer follow-up on 11/13 by Dr Terry Renae  Patient reviewed in room sitting up watching TV, denies any acute complaints today  Denies nausea, vomiting, fever, chills, chest pain, shortness of breath  States had a BM this morning and was a little loose but otherwise normal denies hematochezia  Denies changes in bladder habits  Review of Systems   Constitutional: Negative for chills and fever  Respiratory: Negative for chest tightness and shortness of breath  Cardiovascular: Negative for chest pain  Gastrointestinal: Negative for abdominal distention, abdominal pain and blood in stool  All other systems reviewed and are negative  Objective:     Blood pressure 109/69, pulse 92, temperature 98 7 °F (37 1 °C), temperature source Oral, resp  rate 22, height 5' 5" (1 651 m), weight 81 8 kg (180 lb 5 4 oz), SpO2 96 %  ,Body mass index is 30 01 kg/m²        Intake/Output Summary (Last 24 hours) at 12/13/2019 0941  Last data filed at 12/13/2019 0700  Gross per 24 hour   Intake 1284 48 ml   Output 1250 ml   Net 34 48 ml       Invasive Devices     Peripheral Intravenous Line            Peripheral IV 12/10/19 Right Antecubital 2 days    Peripheral IV 12/10/19 Right Hand 2 days    Peripheral IV 12/11/19 Left Arm 1 day                Physical Exam   Constitutional: He is oriented to person, place, and time  He appears well-developed and well-nourished  No distress  HENT:   Head: Normocephalic and atraumatic  Eyes: EOM are normal    Neck: Normal range of motion  Neck supple  Cardiovascular: Normal rate, regular rhythm and normal heart sounds  Exam reveals no friction rub  No murmur heard  Pulmonary/Chest: Effort normal and breath sounds normal  No stridor  No respiratory distress  He has no wheezes  Abdominal: Soft  Bowel sounds are normal  He exhibits no distension and no mass  There is no tenderness  There is no guarding  Neurological: He is alert and oriented to person, place, and time  Skin: Skin is warm and dry  He is not diaphoretic  Psychiatric: He has a normal mood and affect  His behavior is normal    Nursing note and vitals reviewed           Scheduled Meds:  Current Facility-Administered Medications:  atorvastatin 10 mg Oral Daily GENNARO Yanez    cefazolin 2,000 mg Intravenous Q8H Wendi Steinerella CRNP Last Rate: 2,000 mg (12/13/19 0109)   heparin (porcine) 3-30 Units/kg/hr (Order-Specific) Intravenous Titrated Wendi Steinerella, CRNP Last Rate: 13 Units/kg/hr (12/12/19 2000)   heparin (porcine) 3,200 Units Intravenous PRN Wendi A Jatinella, CRNP    heparin (porcine) 6,400 Units Intravenous PRN GENNARO Yanez    insulin glargine 36 Units Subcutaneous HS Mookie Phelps MD    insulin lispro 1-5 Units Subcutaneous HS GENNARO Yanez    insulin lispro 1-5 Units Subcutaneous 0200 JANIS YanezNP    insulin lispro 1-6 Units Subcutaneous TID AC GENNARO Yanez    insulin lispro 12 Units Subcutaneous TID With Meals Mookie Phelps MD    metroNIDAZOLE 500 mg Intravenous Q8H Wendi A Ramella, CRNP Last Rate: 500 mg (12/13/19 0109)   pantoprazole 40 mg Oral Early Morning Wendi A Ramella, CRNP    potassium-sodium phosphates 1 packet Oral BID With Meals Wendi A Ramella, CRNP      Continuous Infusions:  heparin (porcine) 3-30 Units/kg/hr (Order-Specific) Last Rate: 13 Units/kg/hr (12/12/19 2000)     PRN Meds: heparin (porcine)    heparin (porcine)      Lab, Imaging and other studies:  I have personally reviewed pertinent lab results    , CBC:   Lab Results   Component Value Date    WBC 8 38 12/13/2019    HGB 8 3 (L) 12/13/2019    HCT 24 9 (L) 12/13/2019    MCV 77 (L) 12/13/2019     12/13/2019    MCH 25 8 (L) 12/13/2019    MCHC 33 3 12/13/2019    RDW 16 6 (H) 12/13/2019    MPV 9 9 12/13/2019   , CMP:   Lab Results   Component Value Date    SODIUM 137 12/13/2019    K 3 4 (L) 12/13/2019     12/13/2019    CO2 23 12/13/2019    BUN 3 (L) 12/13/2019    CREATININE 0 63 12/13/2019    CALCIUM 7 3 (L) 12/13/2019    EGFR 126 12/13/2019   , Coagulation: No results found for: PT, INR, APTT, Urinalysis: No results found for: COLORU, CLARITYU, SPECGRAV, PHUR, LEUKOCYTESUR, NITRITE, PROTEINUA, GLUCOSEU, KETONESU, BILIRUBINUR, BLOODU, Amylase: No results found for: AMYLASE, Lipase: No results found for: LIPASE  VTE Pharmacologic Prophylaxis: Heparin  VTE Mechanical Prophylaxis: sequential compression device      Calli Gupta PA-C  12/13/2019 9:41 AM

## 2019-12-15 LAB
BACTERIA BLD CULT: NORMAL
BACTERIA BLD CULT: NORMAL

## 2019-12-16 LAB
GAD65 AB SER-ACNC: 118.3 U/ML (ref 0–5)
PANC ISLET CELL AB TITR SER: NEGATIVE {TITER}

## 2020-01-06 RX ORDER — LANCETS 28 GAUGE
EACH MISCELLANEOUS
COMMUNITY

## 2020-01-07 ENCOUNTER — OFFICE VISIT (OUTPATIENT)
Dept: PULMONOLOGY | Facility: CLINIC | Age: 60
End: 2020-01-07
Payer: COMMERCIAL

## 2020-01-07 VITALS
WEIGHT: 168.8 LBS | BODY MASS INDEX: 28.12 KG/M2 | OXYGEN SATURATION: 99 % | SYSTOLIC BLOOD PRESSURE: 110 MMHG | HEIGHT: 65 IN | HEART RATE: 102 BPM | TEMPERATURE: 98.9 F | DIASTOLIC BLOOD PRESSURE: 70 MMHG

## 2020-01-07 DIAGNOSIS — K63.1 PERFORATED SIGMOID COLON (HCC): ICD-10-CM

## 2020-01-07 DIAGNOSIS — G47.33 OSA (OBSTRUCTIVE SLEEP APNEA): Primary | ICD-10-CM

## 2020-01-07 DIAGNOSIS — I26.99 OTHER ACUTE PULMONARY EMBOLISM WITHOUT ACUTE COR PULMONALE (HCC): ICD-10-CM

## 2020-01-07 PROCEDURE — 99214 OFFICE O/P EST MOD 30 MIN: CPT | Performed by: NURSE PRACTITIONER

## 2020-01-07 RX ORDER — SODIUM FLUORIDE 5 MG/G
GEL, DENTIFRICE DENTAL
COMMUNITY

## 2020-01-07 RX ORDER — INSULIN LISPRO 100 [IU]/ML
INJECTION, SOLUTION INTRAVENOUS; SUBCUTANEOUS
COMMUNITY
Start: 2019-12-13

## 2020-01-07 RX ORDER — DULAGLUTIDE 0.75 MG/.5ML
0.5 INJECTION, SOLUTION SUBCUTANEOUS WEEKLY
COMMUNITY
Start: 2019-11-04

## 2020-01-07 NOTE — ASSESSMENT & PLAN NOTE
*  patient was recently diagnosed with acute submassive bilateral PEs likely secondary to left lower extremity DVT-  patient likely obtain DVT from recent abdominal surgery-   patient reports he was immobile for 4-7 days  *  patient does report a family history of cancer and his brother dying from massive PE  *  patient is currently maintained on Eliquis 5 mg b i d -  PCP managing Eliquis-  will need to strongly consider lifelong anticoagulation given patient's family history

## 2020-01-07 NOTE — PROGRESS NOTES
Pulmonary Follow-Up Note   Irlanda Borrero 64 y o  male MRN: [id number]  1/7/2020      Assessment/Plan:    Problem List Items Addressed This Visit        Digestive    Perforated sigmoid colon ([facility] )     *  patient follows with Dr Kiersten Powell  *  abdominal incisional wound intact clean and dry- patient currently reporting all abdominal symptoms such as tenderness, nausea, and diarrhea have resolved            Respiratory    SUDEEP (obstructive sleep apnea) - Primary     *  patient reports snoring and daytime sleepiness along with witnessed periods of apnea  *  patient was hesitant to pursue testing for SUDEEP-he is agreeable to home sleep study only if he can schedule it on his own time  *  home sleep study order placed-  patient will follow up after studies complete         Relevant Orders    Home Study       Cardiovascular and Mediastinum    Acute pulmonary embolism without acute cor pulmonale ([facility] )     *  patient was recently diagnosed with acute submassive bilateral PEs likely secondary to left lower extremity DVT-  patient likely obtain DVT from recent abdominal surgery-   patient reports he was immobile for 4-7 days  *  patient does report a family history of cancer and his brother dying from massive PE  *  patient is currently maintained on Eliquis 5 mg b i d -  PCP managing Eliquis-  will need to strongly consider lifelong anticoagulation given patient's family history                  Education provided at this visit:   Need for Vaccination:  09/26/2019   Pulmonary Rehab:  Not indicated   Smoking Cessation:  Patient quit 2014   Lung Cancer Screening:  Not indicated   Inhaler Use: N/A    No follow-ups on file  All of Pillo questions were answered prior to leaving the office today  Pillo will follow-up with Dr Sarika Angulo in 6 months or sooner should the need arise  Ana Maria Villalba is aware to call our office with any further questions or concerns      History of Present Illness   Reason for Visit:  Hospital follow-up  Chief Complaint: "I am feeling a lot better"  HPI: Margie Dougherty is a 64 y o  male who presents to the office today for hospital follow-up  Patient's past medical history of diabetes, hyperlipidemia, perforated bowel, PE, and DVT  Patient was recently hospitalized from 12/10/2019-2019 for acute submassive bilateral PEs likely secondary to DVT from recent abdominal surgery was placed on Eliquis  Patient Today patient presents in overall stable respiratory health  Patient does report a significant family history of malignancy and his brother  of massive PE  Will need to consider lifelong anticoagulation therapy given his family history  He reports that upon discharge from the hospital he had some generalized weakness and shortness of breath  Patient stated he went to stay with his mother because she lives on a 1 floor apartment  Patient stated over the last couple weeks he has increased his activity day by day  Patient stated along with increasing his activity he noticed his respiratory status has also improved  Patient stated he has recently moved in to his apartment which is up 1 flight of stairs  Patient states he uses a cane to walk with at this time just for support and if he becomes dyspneic upon walking up the stairs  Patient reports no abdominal symptoms including tenderness, fevers, diarrhea, or wheezing at the incision site  Patient denies any wheezing, chest tightness, pleuritic chest pain, or palpitations  Patient does have a remote smoking history however given no radiographic evidence of emphysema no indication to initiate maintenance therapy or PFTs at this time  From a pulmonary standpoint, patient will begin following with Dr Lazarus Erie  Patient reports a very intermittent smoking history for approximately 10-15 years  Patient stated he would smoke only socially many had drinks with his friends    Patient stated his last cigarette was approximately 10 years ago   Patient has never had formal PFTs  He denies ever being diagnosed with asthma, COPD, or emphysema  Patient is not maintained on any inhaled or oxygen therapies  Patient reports occasional seasonal allergies in which he takes over-the-counter antihistamines p r n  Nicole [place] Patient denies any postnasal drip, GERD, or dysphagia  Patient does report difficulty sleeping throughout the night in which he states is causes daytime sleepiness  He stated he does snore and had witnessed apneic periods  Patient is hesitant on sleep testing however he was agreeable to home sleep study as long as he could schedule it himself  Patient reports mild occupational exposures as he worked at an [employer]  Patient denies having any pets  Patient currently denies any acute exposures to dust, mold, asbestos, or silica  Review of Systems   Constitutional: Positive for activity change  Negative for appetite change  HENT: Negative for congestion and sinus pain  Respiratory: Negative for apnea, cough, choking, chest tightness, shortness of breath, wheezing and stridor  Cardiovascular: Negative for chest pain, palpitations and leg swelling  Gastrointestinal: Negative for abdominal distention and abdominal pain  Genitourinary: Negative for difficulty urinating and frequency  Musculoskeletal: Negative for arthralgias and back pain  Skin: Negative for color change and pallor  Neurological: Negative for dizziness and facial asymmetry  Psychiatric/Behavioral: Negative for agitation and behavioral problems         Historical Information   Past Medical History:   Diagnosis Date    Diabetes mellitus ([facility] )     Hyperlipidemia     Renal disorder      Past Surgical History:   Procedure Laterality Date    [facility]    COLONOSCOPY  10/2019    [facility]    POLYPECTOMY       Family History   Problem Relation Age of Onset    Hypertension Mother     Cancer Father    Karenjessenia Nemo Alcohol abuse Father     No Known Problems Son     No Known Problems Daughter      Social History   Social History     Substance and Sexual Activity   Alcohol Use Not Currently    Frequency: Monthly or less    Drinks per session: 1 or 2    Comment: soc     Social History     Substance and Sexual Activity   Drug Use Not Currently    Types: "Crack" cocaine, Marijuana     Social History     Tobacco Use   Smoking Status Former Smoker    Packs/day: 0 20    Years: 10 00    Pack years: 2 00    Types: Cigarettes    Last attempt to quit:     Years since quittin 0   Smokeless Tobacco Never Used       Meds/Allergies     Current Outpatient Medications:     apixaban (ELIQUIS) 5 mg, Take 2 tablets (10 mg total) by mouth 2 (two) times a day for 7 days, THEN 1 tablet (5 mg total) 2 (two) times a day for 23 days  , Disp: 74 tablet, Rfl: 0    atorvastatin (LIPITOR) 10 mg tablet, Take 10 mg by mouth daily, Disp: , Rfl:     Empagliflozin (JARDIANCE) 10 MG TABS, Jardiance 10 mg tablet  take 1 tablet by mouth daily, Disp: , Rfl:     glucose blood test strip, FreeStyle Lite Meter kit  TEST 2 -3 TIMES A DAY, Disp: , Rfl:     insulin aspart (NOVOLOG FLEXPEN) 100 Units/mL injection pen, Inject 10 Units under the skin 3 (three) times a day with meals, Disp: 3 pen, Rfl: 0    insulin glargine (TOUJEO SOLOSTAR) 300 units/mL CONCETRATED U-300 injection pen (1-unit dial), Toujeo SoloStar U-300 Insulin 300 unit/mL (1 5 mL) subcutaneous pen  inject 10 units subcutaneously at bedtime, Disp: , Rfl:     insulin lispro (HumaLOG) 100 units/mL injection pen, , Disp: , Rfl:     Insulin Pen Needle (BD PEN NEEDLE JAYY 2ND GEN) 32G X 4 MM MISC, BD Jayy 2nd Gen Pen Needle 32 gauge x 5/32"  USE 1 NEEDLE ONCE DAILY, Disp: , Rfl:     Lancets (FREESTYLE) lancets, FreeStyle Lancets 28 gauge  TEST 2-3 TIMES A DAY, Disp: , Rfl:     measles-mumps-rubella (M-M-R II), M-M-R II (PF) 1,000-12,500 TCID50/0 5 mL subcutaneous solution  TO BE ADMINISTERED BY PHARMACIST FOR IMMUNIZATION, Disp: , Rfl:     metFORMIN (GLUCOPHAGE) 1000 MG tablet, Take 1,000 mg by mouth daily, Disp: , Rfl:     Naltrexone-buPROPion HCl ER (CONTRAVE) 8-90 MG TB12, Contrave 8 mg-90 mg tablet,extended release  Take 1 tab qd x 1 wk, then 1 tab bid x 1 wk, then 2 tab am and 1 tab pm x 1wk, then 2 tablet(s) 2 TIMES A DAY by oral route , Disp: , Rfl:     SODIUM FLUORIDE, DENTAL GEL, (PREVIDENT) 1 1 % GEL, PreviDent 5000 Booster Plus 1 1 % dental paste, Disp: , Rfl:     TRULICITY 9 58 ND/9 6XE SOPN, 0 5 mL once a week, Disp: , Rfl:   No Known Allergies    Vitals: Blood pressure 110/70, pulse 102, temperature 98 9 °F (37 2 °C), temperature source Tympanic, height 5' 5" (1 651 m), weight 76 6 kg (168 lb 12 8 oz), SpO2 99 %  Body mass index is 28 09 kg/m²  Oxygen Therapy  SpO2: 99 %  Oxygen Therapy: None (Room air)    Physical Exam:  Physical Exam   Constitutional: He is oriented to person, place, and time  He appears well-developed and well-nourished  No distress  HENT:   Head: Normocephalic and atraumatic  Neck: Normal range of motion  Neck supple  No tracheal deviation present  No thyromegaly present  Cardiovascular: Normal rate, regular rhythm and normal heart sounds  Exam reveals no gallop and no friction rub  No murmur heard  Pulmonary/Chest: Effort normal and breath sounds normal  No accessory muscle usage or stridor  No tachypnea and no bradypnea  No respiratory distress  He has no decreased breath sounds  He has no wheezes  He has no rhonchi  He has no rales  He exhibits no tenderness  Abdominal: Soft  Bowel sounds are normal  He exhibits no distension  There is no tenderness  Musculoskeletal: Normal range of motion  He exhibits no edema or deformity  Left lower extremity tenderness   Neurological: He is alert and oriented to person, place, and time  Skin: Skin is warm and dry  No rash noted  He is not diaphoretic  No erythema     Psychiatric: He has a normal mood and affect  His behavior is normal        Imaging and other studies: I have personally reviewed pertinent films in PACS     CTA 12/10/2019-multiple acute pulmonary embolism within the right upper and lower lobe and distal aspect of the right main pulmonary artery    Pulmonary Results (PFTs, PSG): I have personally reviewed pertinent films in PACS     No PFTs recorded      GENNARO Faustin  [facility]        Portions of the record may have been created with voice recognition software  Occasional wrong word or "sound a like" substitutions may have occurred due to the inherent limitations of voice recognition software  Read the chart carefully and recognize, using context, where substitutions have occurred or contact the dictating provider

## 2020-01-07 NOTE — ASSESSMENT & PLAN NOTE
*  patient follows with Dr Zelaya Hence  *  abdominal incisional wound intact clean and dry- patient currently reporting all abdominal symptoms such as tenderness, nausea, and diarrhea have resolved

## 2020-01-07 NOTE — ASSESSMENT & PLAN NOTE
*  patient reports snoring and daytime sleepiness along with witnessed periods of apnea  *  patient was hesitant to pursue testing for SUDEEP-he is agreeable to home sleep study only if he can schedule it on his own time  *  home sleep study order placed-  patient will follow up after studies complete

## 2020-03-12 ENCOUNTER — TRANSCRIBE ORDERS (OUTPATIENT)
Dept: ADMINISTRATIVE | Facility: HOSPITAL | Age: 60
End: 2020-03-12

## 2020-03-12 DIAGNOSIS — I82.712: Primary | ICD-10-CM

## 2020-03-16 ENCOUNTER — HOSPITAL ENCOUNTER (OUTPATIENT)
Dept: NON INVASIVE DIAGNOSTICS | Facility: CLINIC | Age: 60
Discharge: HOME | End: 2020-03-16
Payer: COMMERCIAL

## 2020-03-16 DIAGNOSIS — I82.712: ICD-10-CM

## 2020-03-16 PROCEDURE — 93970 EXTREMITY STUDY: CPT | Performed by: SURGERY

## 2020-03-16 PROCEDURE — 93970 EXTREMITY STUDY: CPT

## 2020-05-11 ENCOUNTER — HOSPITAL ENCOUNTER (INPATIENT)
Facility: HOSPITAL | Age: 60
LOS: 5 days | Discharge: HOME | DRG: 390 | End: 2020-05-16
Attending: EMERGENCY MEDICINE | Admitting: SURGERY
Payer: COMMERCIAL

## 2020-05-11 ENCOUNTER — APPOINTMENT (EMERGENCY)
Dept: CT IMAGING | Facility: HOSPITAL | Age: 60
DRG: 390 | End: 2020-05-11
Payer: COMMERCIAL

## 2020-05-11 DIAGNOSIS — E11.9 TYPE 2 DIABETES MELLITUS WITHOUT COMPLICATION, WITH LONG-TERM CURRENT USE OF INSULIN (HCC): ICD-10-CM

## 2020-05-11 DIAGNOSIS — Z79.4 TYPE 2 DIABETES MELLITUS WITHOUT COMPLICATION, WITH LONG-TERM CURRENT USE OF INSULIN (HCC): ICD-10-CM

## 2020-05-11 DIAGNOSIS — Z86.711 HISTORY OF PULMONARY EMBOLUS (PE): ICD-10-CM

## 2020-05-11 DIAGNOSIS — K56.609 BOWEL OBSTRUCTION (HCC): Primary | ICD-10-CM

## 2020-05-11 LAB
ALBUMIN SERPL BCP-MCNC: 3 G/DL (ref 3.5–5)
ALP SERPL-CCNC: 105 U/L (ref 46–116)
ALT SERPL W P-5'-P-CCNC: 25 U/L (ref 12–78)
ANION GAP SERPL CALCULATED.3IONS-SCNC: 7 MMOL/L (ref 4–13)
APTT PPP: 32 SECONDS (ref 23–37)
AST SERPL W P-5'-P-CCNC: 15 U/L (ref 5–45)
BASOPHILS # BLD AUTO: 0.05 THOUSANDS/ΜL (ref 0–0.1)
BASOPHILS NFR BLD AUTO: 1 % (ref 0–1)
BILIRUB SERPL-MCNC: 0.33 MG/DL (ref 0.2–1)
BUN SERPL-MCNC: 11 MG/DL (ref 5–25)
CALCIUM SERPL-MCNC: 9.4 MG/DL (ref 8.3–10.1)
CHLORIDE SERPL-SCNC: 107 MMOL/L (ref 100–108)
CO2 SERPL-SCNC: 27 MMOL/L (ref 21–32)
CREAT SERPL-MCNC: 1.09 MG/DL (ref 0.6–1.3)
EOSINOPHIL # BLD AUTO: 0.26 THOUSAND/ΜL (ref 0–0.61)
EOSINOPHIL NFR BLD AUTO: 4 % (ref 0–6)
ERYTHROCYTE [DISTWIDTH] IN BLOOD BY AUTOMATED COUNT: 18.3 % (ref 11.6–15.1)
GFR SERPL CREATININE-BSD FRML MDRD: 87 ML/MIN/1.73SQ M
GLUCOSE SERPL-MCNC: 222 MG/DL (ref 65–140)
HCT VFR BLD AUTO: 47.1 % (ref 36.5–49.3)
HGB BLD-MCNC: 14.6 G/DL (ref 12–17)
IMM GRANULOCYTES # BLD AUTO: 0.04 THOUSAND/UL (ref 0–0.2)
IMM GRANULOCYTES NFR BLD AUTO: 1 % (ref 0–2)
INR PPP: 1.18 (ref 0.84–1.19)
LIPASE SERPL-CCNC: 62 U/L (ref 73–393)
LYMPHOCYTES # BLD AUTO: 1.41 THOUSANDS/ΜL (ref 0.6–4.47)
LYMPHOCYTES NFR BLD AUTO: 20 % (ref 14–44)
MCH RBC QN AUTO: 23.9 PG (ref 26.8–34.3)
MCHC RBC AUTO-ENTMCNC: 31 G/DL (ref 31.4–37.4)
MCV RBC AUTO: 77 FL (ref 82–98)
MONOCYTES # BLD AUTO: 1.21 THOUSAND/ΜL (ref 0.17–1.22)
MONOCYTES NFR BLD AUTO: 17 % (ref 4–12)
NEUTROPHILS # BLD AUTO: 4.11 THOUSANDS/ΜL (ref 1.85–7.62)
NEUTS SEG NFR BLD AUTO: 57 % (ref 43–75)
NRBC BLD AUTO-RTO: 0 /100 WBCS
PLATELET # BLD AUTO: 495 THOUSANDS/UL (ref 149–390)
PMV BLD AUTO: 8.8 FL (ref 8.9–12.7)
POTASSIUM SERPL-SCNC: 4.8 MMOL/L (ref 3.5–5.3)
PROT SERPL-MCNC: 8.1 G/DL (ref 6.4–8.2)
PROTHROMBIN TIME: 14.4 SECONDS (ref 11.6–14.5)
RBC # BLD AUTO: 6.11 MILLION/UL (ref 3.88–5.62)
SODIUM SERPL-SCNC: 141 MMOL/L (ref 136–145)
WBC # BLD AUTO: 7.08 THOUSAND/UL (ref 4.31–10.16)

## 2020-05-11 PROCEDURE — 85610 PROTHROMBIN TIME: CPT | Performed by: EMERGENCY MEDICINE

## 2020-05-11 PROCEDURE — 80053 COMPREHEN METABOLIC PANEL: CPT | Performed by: EMERGENCY MEDICINE

## 2020-05-11 PROCEDURE — 85025 COMPLETE CBC W/AUTO DIFF WBC: CPT | Performed by: EMERGENCY MEDICINE

## 2020-05-11 PROCEDURE — 99285 EMERGENCY DEPT VISIT HI MDM: CPT

## 2020-05-11 PROCEDURE — 74177 CT ABD & PELVIS W/CONTRAST: CPT

## 2020-05-11 PROCEDURE — 36415 COLL VENOUS BLD VENIPUNCTURE: CPT | Performed by: EMERGENCY MEDICINE

## 2020-05-11 PROCEDURE — 83690 ASSAY OF LIPASE: CPT | Performed by: EMERGENCY MEDICINE

## 2020-05-11 PROCEDURE — 96374 THER/PROPH/DIAG INJ IV PUSH: CPT

## 2020-05-11 PROCEDURE — 96361 HYDRATE IV INFUSION ADD-ON: CPT

## 2020-05-11 PROCEDURE — 85730 THROMBOPLASTIN TIME PARTIAL: CPT | Performed by: EMERGENCY MEDICINE

## 2020-05-11 PROCEDURE — 99283 EMERGENCY DEPT VISIT LOW MDM: CPT | Performed by: EMERGENCY MEDICINE

## 2020-05-11 RX ORDER — ONDANSETRON 2 MG/ML
4 INJECTION INTRAMUSCULAR; INTRAVENOUS ONCE
Status: COMPLETED | OUTPATIENT
Start: 2020-05-11 | End: 2020-05-11

## 2020-05-11 RX ORDER — MIDAZOLAM HYDROCHLORIDE 2 MG/2ML
1 INJECTION, SOLUTION INTRAMUSCULAR; INTRAVENOUS ONCE
Status: COMPLETED | OUTPATIENT
Start: 2020-05-12 | End: 2020-05-12

## 2020-05-11 RX ADMIN — ONDANSETRON 4 MG: 2 INJECTION INTRAMUSCULAR; INTRAVENOUS at 21:57

## 2020-05-11 RX ADMIN — SODIUM CHLORIDE 1000 ML: 0.9 INJECTION, SOLUTION INTRAVENOUS at 21:57

## 2020-05-11 RX ADMIN — IOHEXOL 100 ML: 350 INJECTION, SOLUTION INTRAVENOUS at 23:03

## 2020-05-12 PROBLEM — Z79.4 TYPE 2 DIABETES MELLITUS, WITH LONG-TERM CURRENT USE OF INSULIN (HCC): Status: ACTIVE | Noted: 2020-05-12

## 2020-05-12 PROBLEM — Z86.711 HISTORY OF PULMONARY EMBOLUS (PE): Status: ACTIVE | Noted: 2020-05-12

## 2020-05-12 PROBLEM — E11.9 TYPE 2 DIABETES MELLITUS, WITH LONG-TERM CURRENT USE OF INSULIN (HCC): Status: ACTIVE | Noted: 2020-05-12

## 2020-05-12 PROBLEM — K56.609 LARGE BOWEL OBSTRUCTION (HCC): Status: ACTIVE | Noted: 2020-05-12

## 2020-05-12 LAB
ANION GAP SERPL CALCULATED.3IONS-SCNC: 11 MMOL/L (ref 4–13)
BUN SERPL-MCNC: 11 MG/DL (ref 5–25)
CALCIUM SERPL-MCNC: 8.6 MG/DL (ref 8.3–10.1)
CHLORIDE SERPL-SCNC: 106 MMOL/L (ref 100–108)
CO2 SERPL-SCNC: 22 MMOL/L (ref 21–32)
CREAT SERPL-MCNC: 0.93 MG/DL (ref 0.6–1.3)
ERYTHROCYTE [DISTWIDTH] IN BLOOD BY AUTOMATED COUNT: 17.5 % (ref 11.6–15.1)
EST. AVERAGE GLUCOSE BLD GHB EST-MCNC: 269 MG/DL
GFR SERPL CREATININE-BSD FRML MDRD: 105 ML/MIN/1.73SQ M
GLUCOSE SERPL-MCNC: 155 MG/DL (ref 65–140)
GLUCOSE SERPL-MCNC: 171 MG/DL (ref 65–140)
GLUCOSE SERPL-MCNC: 227 MG/DL (ref 65–140)
GLUCOSE SERPL-MCNC: 255 MG/DL (ref 65–140)
HBA1C MFR BLD: 11 %
HCT VFR BLD AUTO: 41.3 % (ref 36.5–49.3)
HGB BLD-MCNC: 12.7 G/DL (ref 12–17)
MCH RBC QN AUTO: 23.6 PG (ref 26.8–34.3)
MCHC RBC AUTO-ENTMCNC: 30.8 G/DL (ref 31.4–37.4)
MCV RBC AUTO: 77 FL (ref 82–98)
PLATELET # BLD AUTO: 452 THOUSANDS/UL (ref 149–390)
PMV BLD AUTO: 9.2 FL (ref 8.9–12.7)
POTASSIUM SERPL-SCNC: 4.9 MMOL/L (ref 3.5–5.3)
RBC # BLD AUTO: 5.39 MILLION/UL (ref 3.88–5.62)
SODIUM SERPL-SCNC: 139 MMOL/L (ref 136–145)
WBC # BLD AUTO: 7.22 THOUSAND/UL (ref 4.31–10.16)

## 2020-05-12 PROCEDURE — 85027 COMPLETE CBC AUTOMATED: CPT | Performed by: PHYSICIAN ASSISTANT

## 2020-05-12 PROCEDURE — 82948 REAGENT STRIP/BLOOD GLUCOSE: CPT

## 2020-05-12 PROCEDURE — 99253 IP/OBS CNSLTJ NEW/EST LOW 45: CPT | Performed by: INTERNAL MEDICINE

## 2020-05-12 PROCEDURE — 83036 HEMOGLOBIN GLYCOSYLATED A1C: CPT | Performed by: INTERNAL MEDICINE

## 2020-05-12 PROCEDURE — 80048 BASIC METABOLIC PNL TOTAL CA: CPT | Performed by: PHYSICIAN ASSISTANT

## 2020-05-12 RX ORDER — HYDROMORPHONE HCL/PF 1 MG/ML
1 SYRINGE (ML) INJECTION EVERY 4 HOURS PRN
Status: DISCONTINUED | OUTPATIENT
Start: 2020-05-12 | End: 2020-05-16 | Stop reason: HOSPADM

## 2020-05-12 RX ORDER — HYDROMORPHONE HCL/PF 1 MG/ML
0.5 SYRINGE (ML) INJECTION EVERY 4 HOURS PRN
Status: DISCONTINUED | OUTPATIENT
Start: 2020-05-12 | End: 2020-05-16 | Stop reason: HOSPADM

## 2020-05-12 RX ORDER — LIDOCAINE HYDROCHLORIDE 20 MG/ML
1 JELLY TOPICAL ONCE
Status: COMPLETED | OUTPATIENT
Start: 2020-05-12 | End: 2020-05-12

## 2020-05-12 RX ORDER — LIDOCAINE HYDROCHLORIDE 20 MG/ML
JELLY TOPICAL
Status: DISPENSED
Start: 2020-05-12 | End: 2020-05-12

## 2020-05-12 RX ORDER — DEXTROSE, SODIUM CHLORIDE, AND POTASSIUM CHLORIDE 5; .45; .15 G/100ML; G/100ML; G/100ML
150 INJECTION INTRAVENOUS CONTINUOUS
Status: DISCONTINUED | OUTPATIENT
Start: 2020-05-12 | End: 2020-05-15

## 2020-05-12 RX ORDER — SIMETHICONE 20 MG/.3ML
40 EMULSION ORAL EVERY 6 HOURS PRN
Status: DISCONTINUED | OUTPATIENT
Start: 2020-05-12 | End: 2020-05-16 | Stop reason: HOSPADM

## 2020-05-12 RX ORDER — HEPARIN SODIUM 5000 [USP'U]/ML
5000 INJECTION, SOLUTION INTRAVENOUS; SUBCUTANEOUS EVERY 8 HOURS SCHEDULED
Status: DISCONTINUED | OUTPATIENT
Start: 2020-05-12 | End: 2020-05-14

## 2020-05-12 RX ORDER — ONDANSETRON 2 MG/ML
4 INJECTION INTRAMUSCULAR; INTRAVENOUS EVERY 4 HOURS PRN
Status: DISCONTINUED | OUTPATIENT
Start: 2020-05-12 | End: 2020-05-16 | Stop reason: HOSPADM

## 2020-05-12 RX ORDER — KETAMINE HCL IN NACL, ISO-OSM 100MG/10ML
25 SYRINGE (ML) INJECTION ONCE
Status: COMPLETED | OUTPATIENT
Start: 2020-05-12 | End: 2020-05-12

## 2020-05-12 RX ORDER — HYDROMORPHONE HCL/PF 1 MG/ML
0.2 SYRINGE (ML) INJECTION EVERY 4 HOURS PRN
Status: DISCONTINUED | OUTPATIENT
Start: 2020-05-12 | End: 2020-05-16 | Stop reason: HOSPADM

## 2020-05-12 RX ADMIN — Medication 25 MG: at 01:12

## 2020-05-12 RX ADMIN — INSULIN LISPRO 4 UNITS: 100 INJECTION, SOLUTION INTRAVENOUS; SUBCUTANEOUS at 13:49

## 2020-05-12 RX ADMIN — TOPICAL ANESTHETIC 2 SPRAY: 200 SPRAY DENTAL; PERIODONTAL at 01:14

## 2020-05-12 RX ADMIN — INSULIN LISPRO 2 UNITS: 100 INJECTION, SOLUTION INTRAVENOUS; SUBCUTANEOUS at 13:49

## 2020-05-12 RX ADMIN — HEPARIN SODIUM 5000 UNITS: 5000 INJECTION INTRAVENOUS; SUBCUTANEOUS at 06:36

## 2020-05-12 RX ADMIN — LIDOCAINE HYDROCHLORIDE 1 APPLICATION: 20 JELLY TOPICAL at 01:14

## 2020-05-12 RX ADMIN — HEPARIN SODIUM 5000 UNITS: 5000 INJECTION INTRAVENOUS; SUBCUTANEOUS at 20:52

## 2020-05-12 RX ADMIN — DEXTROSE, SODIUM CHLORIDE, AND POTASSIUM CHLORIDE 150 ML/HR: 5; .45; .15 INJECTION INTRAVENOUS at 07:21

## 2020-05-12 RX ADMIN — DEXTROSE, SODIUM CHLORIDE, AND POTASSIUM CHLORIDE 150 ML/HR: 5; .45; .15 INJECTION INTRAVENOUS at 00:33

## 2020-05-12 RX ADMIN — DEXTROSE, SODIUM CHLORIDE, AND POTASSIUM CHLORIDE 150 ML/HR: 5; .45; .15 INJECTION INTRAVENOUS at 20:56

## 2020-05-12 RX ADMIN — TOPICAL ANESTHETIC 2 SPRAY: 200 SPRAY DENTAL; PERIODONTAL at 00:33

## 2020-05-12 RX ADMIN — MIDAZOLAM 1 MG: 1 INJECTION INTRAMUSCULAR; INTRAVENOUS at 00:34

## 2020-05-12 RX ADMIN — INSULIN LISPRO 1 UNITS: 100 INJECTION, SOLUTION INTRAVENOUS; SUBCUTANEOUS at 18:39

## 2020-05-12 RX ADMIN — INSULIN LISPRO 4 UNITS: 100 INJECTION, SOLUTION INTRAVENOUS; SUBCUTANEOUS at 18:39

## 2020-05-12 RX ADMIN — DEXTROSE, SODIUM CHLORIDE, AND POTASSIUM CHLORIDE 150 ML/HR: 5; .45; .15 INJECTION INTRAVENOUS at 13:49

## 2020-05-12 RX ADMIN — HEPARIN SODIUM 5000 UNITS: 5000 INJECTION INTRAVENOUS; SUBCUTANEOUS at 13:49

## 2020-05-13 ENCOUNTER — APPOINTMENT (INPATIENT)
Dept: RADIOLOGY | Facility: HOSPITAL | Age: 60
DRG: 390 | End: 2020-05-13
Payer: COMMERCIAL

## 2020-05-13 LAB
ANION GAP SERPL CALCULATED.3IONS-SCNC: 10 MMOL/L (ref 4–13)
BUN SERPL-MCNC: 12 MG/DL (ref 5–25)
CALCIUM SERPL-MCNC: 8 MG/DL (ref 8.3–10.1)
CHLORIDE SERPL-SCNC: 105 MMOL/L (ref 100–108)
CO2 SERPL-SCNC: 23 MMOL/L (ref 21–32)
CREAT SERPL-MCNC: 1.04 MG/DL (ref 0.6–1.3)
ERYTHROCYTE [DISTWIDTH] IN BLOOD BY AUTOMATED COUNT: 17.5 % (ref 11.6–15.1)
GFR SERPL CREATININE-BSD FRML MDRD: 92 ML/MIN/1.73SQ M
GLUCOSE SERPL-MCNC: 139 MG/DL (ref 65–140)
GLUCOSE SERPL-MCNC: 153 MG/DL (ref 65–140)
GLUCOSE SERPL-MCNC: 153 MG/DL (ref 65–140)
GLUCOSE SERPL-MCNC: 169 MG/DL (ref 65–140)
GLUCOSE SERPL-MCNC: 194 MG/DL (ref 65–140)
GLUCOSE SERPL-MCNC: 195 MG/DL (ref 65–140)
HCT VFR BLD AUTO: 39.6 % (ref 36.5–49.3)
HGB BLD-MCNC: 12 G/DL (ref 12–17)
MCH RBC QN AUTO: 23.3 PG (ref 26.8–34.3)
MCHC RBC AUTO-ENTMCNC: 30.3 G/DL (ref 31.4–37.4)
MCV RBC AUTO: 77 FL (ref 82–98)
PLATELET # BLD AUTO: 378 THOUSANDS/UL (ref 149–390)
PMV BLD AUTO: 8.7 FL (ref 8.9–12.7)
POTASSIUM SERPL-SCNC: 4.2 MMOL/L (ref 3.5–5.3)
RBC # BLD AUTO: 5.14 MILLION/UL (ref 3.88–5.62)
SODIUM SERPL-SCNC: 138 MMOL/L (ref 136–145)
WBC # BLD AUTO: 7.25 THOUSAND/UL (ref 4.31–10.16)

## 2020-05-13 PROCEDURE — 74018 RADEX ABDOMEN 1 VIEW: CPT

## 2020-05-13 PROCEDURE — 85027 COMPLETE CBC AUTOMATED: CPT | Performed by: PHYSICIAN ASSISTANT

## 2020-05-13 PROCEDURE — 80048 BASIC METABOLIC PNL TOTAL CA: CPT | Performed by: PHYSICIAN ASSISTANT

## 2020-05-13 PROCEDURE — 82948 REAGENT STRIP/BLOOD GLUCOSE: CPT

## 2020-05-13 PROCEDURE — 99232 SBSQ HOSP IP/OBS MODERATE 35: CPT | Performed by: PHYSICIAN ASSISTANT

## 2020-05-13 RX ADMIN — INSULIN LISPRO 4 UNITS: 100 INJECTION, SOLUTION INTRAVENOUS; SUBCUTANEOUS at 13:23

## 2020-05-13 RX ADMIN — DEXTROSE, SODIUM CHLORIDE, AND POTASSIUM CHLORIDE 150 ML/HR: 5; .45; .15 INJECTION INTRAVENOUS at 13:23

## 2020-05-13 RX ADMIN — INSULIN LISPRO 1 UNITS: 100 INJECTION, SOLUTION INTRAVENOUS; SUBCUTANEOUS at 13:23

## 2020-05-13 RX ADMIN — HEPARIN SODIUM 5000 UNITS: 5000 INJECTION INTRAVENOUS; SUBCUTANEOUS at 13:22

## 2020-05-13 RX ADMIN — INSULIN LISPRO 1 UNITS: 100 INJECTION, SOLUTION INTRAVENOUS; SUBCUTANEOUS at 01:40

## 2020-05-13 RX ADMIN — DEXTROSE, SODIUM CHLORIDE, AND POTASSIUM CHLORIDE 150 ML/HR: 5; .45; .15 INJECTION INTRAVENOUS at 21:14

## 2020-05-13 RX ADMIN — INSULIN LISPRO 4 UNITS: 100 INJECTION, SOLUTION INTRAVENOUS; SUBCUTANEOUS at 01:40

## 2020-05-13 RX ADMIN — HEPARIN SODIUM 5000 UNITS: 5000 INJECTION INTRAVENOUS; SUBCUTANEOUS at 06:36

## 2020-05-13 RX ADMIN — INSULIN LISPRO 4 UNITS: 100 INJECTION, SOLUTION INTRAVENOUS; SUBCUTANEOUS at 19:42

## 2020-05-13 RX ADMIN — DEXTROSE, SODIUM CHLORIDE, AND POTASSIUM CHLORIDE 150 ML/HR: 5; .45; .15 INJECTION INTRAVENOUS at 04:21

## 2020-05-13 RX ADMIN — INSULIN LISPRO 1 UNITS: 100 INJECTION, SOLUTION INTRAVENOUS; SUBCUTANEOUS at 08:24

## 2020-05-13 RX ADMIN — INSULIN LISPRO 2 UNITS: 100 INJECTION, SOLUTION INTRAVENOUS; SUBCUTANEOUS at 19:42

## 2020-05-13 RX ADMIN — HEPARIN SODIUM 5000 UNITS: 5000 INJECTION INTRAVENOUS; SUBCUTANEOUS at 21:14

## 2020-05-13 RX ADMIN — INSULIN LISPRO 4 UNITS: 100 INJECTION, SOLUTION INTRAVENOUS; SUBCUTANEOUS at 08:23

## 2020-05-14 ENCOUNTER — APPOINTMENT (INPATIENT)
Dept: RADIOLOGY | Facility: HOSPITAL | Age: 60
DRG: 390 | End: 2020-05-14
Payer: COMMERCIAL

## 2020-05-14 LAB
ANION GAP SERPL CALCULATED.3IONS-SCNC: 10 MMOL/L (ref 4–13)
BUN SERPL-MCNC: 11 MG/DL (ref 5–25)
CALCIUM SERPL-MCNC: 8 MG/DL (ref 8.3–10.1)
CHLORIDE SERPL-SCNC: 105 MMOL/L (ref 100–108)
CO2 SERPL-SCNC: 23 MMOL/L (ref 21–32)
CREAT SERPL-MCNC: 0.91 MG/DL (ref 0.6–1.3)
ERYTHROCYTE [DISTWIDTH] IN BLOOD BY AUTOMATED COUNT: 17.2 % (ref 11.6–15.1)
GFR SERPL CREATININE-BSD FRML MDRD: 108 ML/MIN/1.73SQ M
GLUCOSE SERPL-MCNC: 142 MG/DL (ref 65–140)
GLUCOSE SERPL-MCNC: 153 MG/DL (ref 65–140)
GLUCOSE SERPL-MCNC: 173 MG/DL (ref 65–140)
GLUCOSE SERPL-MCNC: 194 MG/DL (ref 65–140)
GLUCOSE SERPL-MCNC: 228 MG/DL (ref 65–140)
GLUCOSE SERPL-MCNC: 240 MG/DL (ref 65–140)
HCT VFR BLD AUTO: 36.2 % (ref 36.5–49.3)
HGB BLD-MCNC: 11.1 G/DL (ref 12–17)
MCH RBC QN AUTO: 24 PG (ref 26.8–34.3)
MCHC RBC AUTO-ENTMCNC: 30.7 G/DL (ref 31.4–37.4)
MCV RBC AUTO: 78 FL (ref 82–98)
PLATELET # BLD AUTO: 372 THOUSANDS/UL (ref 149–390)
PMV BLD AUTO: 8.9 FL (ref 8.9–12.7)
POTASSIUM SERPL-SCNC: 3.7 MMOL/L (ref 3.5–5.3)
RBC # BLD AUTO: 4.63 MILLION/UL (ref 3.88–5.62)
SODIUM SERPL-SCNC: 138 MMOL/L (ref 136–145)
WBC # BLD AUTO: 6.55 THOUSAND/UL (ref 4.31–10.16)

## 2020-05-14 PROCEDURE — 85027 COMPLETE CBC AUTOMATED: CPT | Performed by: PHYSICIAN ASSISTANT

## 2020-05-14 PROCEDURE — 74018 RADEX ABDOMEN 1 VIEW: CPT

## 2020-05-14 PROCEDURE — 80048 BASIC METABOLIC PNL TOTAL CA: CPT | Performed by: PHYSICIAN ASSISTANT

## 2020-05-14 PROCEDURE — 82948 REAGENT STRIP/BLOOD GLUCOSE: CPT

## 2020-05-14 PROCEDURE — 99232 SBSQ HOSP IP/OBS MODERATE 35: CPT | Performed by: PHYSICIAN ASSISTANT

## 2020-05-14 PROCEDURE — 74280 X-RAY XM COLON 2CNTRST STD: CPT

## 2020-05-14 RX ADMIN — INSULIN LISPRO 2 UNITS: 100 INJECTION, SOLUTION INTRAVENOUS; SUBCUTANEOUS at 13:42

## 2020-05-14 RX ADMIN — INSULIN LISPRO 1 UNITS: 100 INJECTION, SOLUTION INTRAVENOUS; SUBCUTANEOUS at 01:07

## 2020-05-14 RX ADMIN — DEXTROSE, SODIUM CHLORIDE, AND POTASSIUM CHLORIDE 150 ML/HR: 5; .45; .15 INJECTION INTRAVENOUS at 02:59

## 2020-05-14 RX ADMIN — IOHEXOL 1000 ML: 350 INJECTION, SOLUTION INTRAVENOUS at 10:59

## 2020-05-14 RX ADMIN — HEPARIN SODIUM 5000 UNITS: 5000 INJECTION INTRAVENOUS; SUBCUTANEOUS at 13:46

## 2020-05-14 RX ADMIN — INSULIN LISPRO 4 UNITS: 100 INJECTION, SOLUTION INTRAVENOUS; SUBCUTANEOUS at 13:41

## 2020-05-14 RX ADMIN — HEPARIN SODIUM 5000 UNITS: 5000 INJECTION INTRAVENOUS; SUBCUTANEOUS at 05:21

## 2020-05-14 RX ADMIN — DEXTROSE, SODIUM CHLORIDE, AND POTASSIUM CHLORIDE 150 ML/HR: 5; .45; .15 INJECTION INTRAVENOUS at 13:47

## 2020-05-14 RX ADMIN — INSULIN LISPRO 4 UNITS: 100 INJECTION, SOLUTION INTRAVENOUS; SUBCUTANEOUS at 01:06

## 2020-05-14 RX ADMIN — ENOXAPARIN SODIUM 80 MG: 80 INJECTION SUBCUTANEOUS at 17:31

## 2020-05-15 PROBLEM — E78.5 HLD (HYPERLIPIDEMIA): Status: ACTIVE | Noted: 2020-05-15

## 2020-05-15 LAB
GLUCOSE SERPL-MCNC: 106 MG/DL (ref 65–140)
GLUCOSE SERPL-MCNC: 146 MG/DL (ref 65–140)
GLUCOSE SERPL-MCNC: 181 MG/DL (ref 65–140)
GLUCOSE SERPL-MCNC: 201 MG/DL (ref 65–140)

## 2020-05-15 PROCEDURE — 99232 SBSQ HOSP IP/OBS MODERATE 35: CPT | Performed by: PHYSICIAN ASSISTANT

## 2020-05-15 PROCEDURE — 82948 REAGENT STRIP/BLOOD GLUCOSE: CPT

## 2020-05-15 RX ORDER — POTASSIUM CHLORIDE AND SODIUM CHLORIDE 450; 150 MG/100ML; MG/100ML
150 INJECTION, SOLUTION INTRAVENOUS CONTINUOUS
Status: DISCONTINUED | OUTPATIENT
Start: 2020-05-15 | End: 2020-05-15 | Stop reason: SDUPTHER

## 2020-05-15 RX ORDER — INSULIN GLARGINE 100 [IU]/ML
15 INJECTION, SOLUTION SUBCUTANEOUS
Status: DISCONTINUED | OUTPATIENT
Start: 2020-05-15 | End: 2020-05-16 | Stop reason: HOSPADM

## 2020-05-15 RX ADMIN — INSULIN GLARGINE 15 UNITS: 100 INJECTION, SOLUTION SUBCUTANEOUS at 22:25

## 2020-05-15 RX ADMIN — ENOXAPARIN SODIUM 80 MG: 80 INJECTION SUBCUTANEOUS at 04:55

## 2020-05-15 RX ADMIN — ENOXAPARIN SODIUM 80 MG: 80 INJECTION SUBCUTANEOUS at 11:44

## 2020-05-15 RX ADMIN — DEXTROSE, SODIUM CHLORIDE, AND POTASSIUM CHLORIDE 150 ML/HR: 5; .45; .15 INJECTION INTRAVENOUS at 00:03

## 2020-05-15 RX ADMIN — POTASSIUM CHLORIDE: 149 INJECTION, SOLUTION, CONCENTRATE INTRAVENOUS at 18:12

## 2020-05-15 RX ADMIN — POTASSIUM CHLORIDE: 149 INJECTION, SOLUTION, CONCENTRATE INTRAVENOUS at 08:23

## 2020-05-16 VITALS
RESPIRATION RATE: 18 BRPM | BODY MASS INDEX: 28.14 KG/M2 | HEIGHT: 65 IN | HEART RATE: 79 BPM | OXYGEN SATURATION: 99 % | TEMPERATURE: 98.3 F | DIASTOLIC BLOOD PRESSURE: 68 MMHG | WEIGHT: 168.87 LBS | SYSTOLIC BLOOD PRESSURE: 113 MMHG

## 2020-05-16 LAB
GLUCOSE SERPL-MCNC: 132 MG/DL (ref 65–140)
GLUCOSE SERPL-MCNC: 87 MG/DL (ref 65–140)

## 2020-05-16 PROCEDURE — 82948 REAGENT STRIP/BLOOD GLUCOSE: CPT

## 2020-05-16 PROCEDURE — 99232 SBSQ HOSP IP/OBS MODERATE 35: CPT | Performed by: INTERNAL MEDICINE

## 2020-05-16 RX ADMIN — POTASSIUM CHLORIDE: 149 INJECTION, SOLUTION, CONCENTRATE INTRAVENOUS at 01:36

## 2020-05-16 RX ADMIN — POTASSIUM CHLORIDE: 149 INJECTION, SOLUTION, CONCENTRATE INTRAVENOUS at 08:51

## 2020-05-16 RX ADMIN — ENOXAPARIN SODIUM 80 MG: 80 INJECTION SUBCUTANEOUS at 00:37

## 2020-05-16 RX ADMIN — ENOXAPARIN SODIUM 80 MG: 80 INJECTION SUBCUTANEOUS at 13:29

## 2020-09-21 ENCOUNTER — TRANSCRIBE ORDERS (OUTPATIENT)
Dept: ADMINISTRATIVE | Facility: HOSPITAL | Age: 60
End: 2020-09-21

## 2020-09-21 ENCOUNTER — APPOINTMENT (OUTPATIENT)
Dept: LAB | Facility: HOSPITAL | Age: 60
End: 2020-09-21
Payer: COMMERCIAL

## 2020-09-21 DIAGNOSIS — E55.9 VITAMIN D DEFICIENCY: ICD-10-CM

## 2020-09-21 DIAGNOSIS — E78.5 HYPERLIPIDEMIA, UNSPECIFIED HYPERLIPIDEMIA TYPE: ICD-10-CM

## 2020-09-21 DIAGNOSIS — E11.9 DIABETES MELLITUS, STABLE (HCC): ICD-10-CM

## 2020-09-21 DIAGNOSIS — E78.5 HYPERLIPIDEMIA, UNSPECIFIED HYPERLIPIDEMIA TYPE: Primary | ICD-10-CM

## 2020-09-21 LAB
25(OH)D3 SERPL-MCNC: 23.2 NG/ML (ref 30–100)
ALBUMIN SERPL BCP-MCNC: 4.1 G/DL (ref 3.4–4.8)
ALP SERPL-CCNC: 72.1 U/L (ref 10–129)
ALT SERPL W P-5'-P-CCNC: 22 U/L (ref 5–63)
ANION GAP SERPL CALCULATED.3IONS-SCNC: 7 MMOL/L (ref 4–13)
AST SERPL W P-5'-P-CCNC: 16 U/L (ref 15–41)
BACTERIA UR QL AUTO: NORMAL /HPF
BILIRUB SERPL-MCNC: 1.04 MG/DL (ref 0.3–1.2)
BILIRUB UR QL STRIP: NEGATIVE
BUN SERPL-MCNC: 17 MG/DL (ref 6–20)
CALCIUM SERPL-MCNC: 9.5 MG/DL (ref 8.4–10.2)
CHLORIDE SERPL-SCNC: 107 MMOL/L (ref 96–108)
CHOLEST SERPL-MCNC: 186 MG/DL
CLARITY UR: CLEAR
CO2 SERPL-SCNC: 30 MMOL/L (ref 22–33)
COLOR UR: YELLOW
CREAT SERPL-MCNC: 1.15 MG/DL (ref 0.5–1.2)
CREAT UR-MCNC: 108 MG/DL
ERYTHROCYTE [DISTWIDTH] IN BLOOD BY AUTOMATED COUNT: 15.9 % (ref 11.6–15.1)
EST. AVERAGE GLUCOSE BLD GHB EST-MCNC: 183 MG/DL
GFR SERPL CREATININE-BSD FRML MDRD: 81 ML/MIN/1.73SQ M
GLUCOSE P FAST SERPL-MCNC: 149 MG/DL (ref 70–100)
GLUCOSE UR STRIP-MCNC: ABNORMAL MG/DL
HBA1C MFR BLD: 8 %
HCT VFR BLD AUTO: 48.2 % (ref 36.5–49.3)
HDLC SERPL-MCNC: 92 MG/DL
HGB BLD-MCNC: 15.8 G/DL (ref 12–17)
HGB UR QL STRIP.AUTO: ABNORMAL
KETONES UR STRIP-MCNC: NEGATIVE MG/DL
LDLC SERPL CALC-MCNC: 81 MG/DL (ref 0–100)
LEUKOCYTE ESTERASE UR QL STRIP: NEGATIVE
MCH RBC QN AUTO: 25.2 PG (ref 26.8–34.3)
MCHC RBC AUTO-ENTMCNC: 32.8 G/DL (ref 31.4–37.4)
MCV RBC AUTO: 77 FL (ref 82–98)
MICROALBUMIN UR-MCNC: 284 MG/L (ref 0–20)
MICROALBUMIN/CREAT 24H UR: 263 MG/G CREATININE (ref 0–30)
NITRITE UR QL STRIP: NEGATIVE
NON-SQ EPI CELLS URNS QL MICRO: NORMAL /HPF
NONHDLC SERPL-MCNC: 94 MG/DL
PH UR STRIP.AUTO: 6 [PH]
PLATELET # BLD AUTO: 273 THOUSANDS/UL (ref 149–390)
PMV BLD AUTO: 9.5 FL (ref 8.9–12.7)
POTASSIUM SERPL-SCNC: 4.4 MMOL/L (ref 3.5–5)
PROT SERPL-MCNC: 7.4 G/DL (ref 6.4–8.3)
PROT UR STRIP-MCNC: ABNORMAL MG/DL
RBC # BLD AUTO: 6.26 MILLION/UL (ref 3.88–5.62)
RBC #/AREA URNS AUTO: NORMAL /HPF
SODIUM SERPL-SCNC: 144 MMOL/L (ref 133–145)
SP GR UR STRIP.AUTO: 1.02 (ref 1–1.03)
TRIGL SERPL-MCNC: 64.7 MG/DL
UROBILINOGEN UR QL STRIP.AUTO: 0.2 E.U./DL
WBC # BLD AUTO: 5.42 THOUSAND/UL (ref 4.31–10.16)
WBC #/AREA URNS AUTO: NORMAL /HPF

## 2020-09-21 PROCEDURE — 80053 COMPREHEN METABOLIC PANEL: CPT

## 2020-09-21 PROCEDURE — 36415 COLL VENOUS BLD VENIPUNCTURE: CPT

## 2020-09-21 PROCEDURE — 82043 UR ALBUMIN QUANTITATIVE: CPT | Performed by: INTERNAL MEDICINE

## 2020-09-21 PROCEDURE — 81001 URINALYSIS AUTO W/SCOPE: CPT | Performed by: INTERNAL MEDICINE

## 2020-09-21 PROCEDURE — 85027 COMPLETE CBC AUTOMATED: CPT

## 2020-09-21 PROCEDURE — 83036 HEMOGLOBIN GLYCOSYLATED A1C: CPT

## 2020-09-21 PROCEDURE — 80061 LIPID PANEL: CPT

## 2020-09-21 PROCEDURE — 82570 ASSAY OF URINE CREATININE: CPT | Performed by: INTERNAL MEDICINE

## 2020-09-21 PROCEDURE — 81003 URINALYSIS AUTO W/O SCOPE: CPT | Performed by: INTERNAL MEDICINE

## 2020-09-21 PROCEDURE — 82306 VITAMIN D 25 HYDROXY: CPT

## 2020-10-29 ENCOUNTER — TRANSCRIBE ORDERS (OUTPATIENT)
Dept: ADMINISTRATIVE | Facility: HOSPITAL | Age: 60
End: 2020-10-29

## 2020-10-29 ENCOUNTER — LAB (OUTPATIENT)
Dept: LAB | Facility: HOSPITAL | Age: 60
End: 2020-10-29
Payer: COMMERCIAL

## 2020-10-29 DIAGNOSIS — C18.9 MALIGNANT NEOPLASM OF COLON, UNSPECIFIED PART OF COLON (HCC): ICD-10-CM

## 2020-10-29 DIAGNOSIS — G89.3 NEOPLASM RELATED PAIN: ICD-10-CM

## 2020-10-29 DIAGNOSIS — C18.9 MALIGNANT NEOPLASM OF COLON, UNSPECIFIED PART OF COLON (HCC): Primary | ICD-10-CM

## 2020-10-29 LAB
ALBUMIN SERPL BCP-MCNC: 4.1 G/DL (ref 3.4–4.8)
ALP SERPL-CCNC: 70.4 U/L (ref 10–129)
ALT SERPL W P-5'-P-CCNC: 23 U/L (ref 5–63)
ANION GAP SERPL CALCULATED.3IONS-SCNC: 7 MMOL/L (ref 4–13)
AST SERPL W P-5'-P-CCNC: 18 U/L (ref 15–41)
BASOPHILS # BLD AUTO: 0.14 THOUSANDS/ΜL (ref 0–0.1)
BASOPHILS NFR BLD AUTO: 3 % (ref 0–1)
BILIRUB SERPL-MCNC: 0.97 MG/DL (ref 0.3–1.2)
BUN SERPL-MCNC: 22 MG/DL (ref 6–20)
CALCIUM SERPL-MCNC: 9.4 MG/DL (ref 8.4–10.2)
CEA SERPL-MCNC: 0.5 NG/ML (ref 0–3)
CHLORIDE SERPL-SCNC: 107 MMOL/L (ref 96–108)
CO2 SERPL-SCNC: 27 MMOL/L (ref 22–33)
CREAT SERPL-MCNC: 1.15 MG/DL (ref 0.5–1.2)
EOSINOPHIL # BLD AUTO: 0.31 THOUSAND/ΜL (ref 0–0.61)
EOSINOPHIL NFR BLD AUTO: 7 % (ref 0–6)
ERYTHROCYTE [DISTWIDTH] IN BLOOD BY AUTOMATED COUNT: 16.1 % (ref 11.6–15.1)
GFR SERPL CREATININE-BSD FRML MDRD: 81 ML/MIN/1.73SQ M
GLUCOSE P FAST SERPL-MCNC: 136 MG/DL (ref 70–105)
HCT VFR BLD AUTO: 46.3 % (ref 36.5–49.3)
HGB BLD-MCNC: 15 G/DL (ref 12–17)
IMM GRANULOCYTES # BLD AUTO: 0.01 THOUSAND/UL (ref 0–0.2)
IMM GRANULOCYTES NFR BLD AUTO: 0 % (ref 0–2)
LYMPHOCYTES # BLD AUTO: 1.51 THOUSANDS/ΜL (ref 0.6–4.47)
LYMPHOCYTES NFR BLD AUTO: 32 % (ref 14–44)
MCH RBC QN AUTO: 25.2 PG (ref 26.8–34.3)
MCHC RBC AUTO-ENTMCNC: 32.4 G/DL (ref 31.4–37.4)
MCV RBC AUTO: 78 FL (ref 82–98)
MONOCYTES # BLD AUTO: 0.42 THOUSAND/ΜL (ref 0.17–1.22)
MONOCYTES NFR BLD AUTO: 9 % (ref 4–12)
NEUTROPHILS # BLD AUTO: 2.37 THOUSANDS/ΜL (ref 1.85–7.62)
NEUTS SEG NFR BLD AUTO: 49 % (ref 43–75)
PLATELET # BLD AUTO: 282 THOUSANDS/UL (ref 149–390)
PMV BLD AUTO: 9.3 FL (ref 8.9–12.7)
POTASSIUM SERPL-SCNC: 4 MMOL/L (ref 3.5–5)
PROT SERPL-MCNC: 6.9 G/DL (ref 6.4–8.3)
RBC # BLD AUTO: 5.96 MILLION/UL (ref 3.88–5.62)
SODIUM SERPL-SCNC: 141 MMOL/L (ref 133–145)
WBC # BLD AUTO: 4.76 THOUSAND/UL (ref 4.31–10.16)

## 2020-10-29 PROCEDURE — 36415 COLL VENOUS BLD VENIPUNCTURE: CPT

## 2020-10-29 PROCEDURE — 85025 COMPLETE CBC W/AUTO DIFF WBC: CPT

## 2020-10-29 PROCEDURE — 82378 CARCINOEMBRYONIC ANTIGEN: CPT

## 2020-10-29 PROCEDURE — 80053 COMPREHEN METABOLIC PANEL: CPT

## 2020-10-29 PROCEDURE — 86301 IMMUNOASSAY TUMOR CA 19-9: CPT

## 2020-10-30 LAB — CANCER AG19-9 SERPL-ACNC: 13 U/ML (ref 0–35)

## 2021-03-29 ENCOUNTER — APPOINTMENT (OUTPATIENT)
Dept: LAB | Facility: HOSPITAL | Age: 61
End: 2021-03-29
Payer: COMMERCIAL

## 2021-03-29 ENCOUNTER — TRANSCRIBE ORDERS (OUTPATIENT)
Dept: ADMINISTRATIVE | Facility: HOSPITAL | Age: 61
End: 2021-03-29

## 2021-03-29 DIAGNOSIS — E11.00 TYPE II DIABETES MELLITUS WITH HYPEROSMOLARITY, UNCONTROLLED (HCC): Primary | ICD-10-CM

## 2021-03-29 DIAGNOSIS — E11.65 TYPE II DIABETES MELLITUS WITH HYPEROSMOLARITY, UNCONTROLLED (HCC): Primary | ICD-10-CM

## 2021-03-29 DIAGNOSIS — E55.9 VITAMIN D DEFICIENCY: ICD-10-CM

## 2021-03-29 DIAGNOSIS — I10 HYPERTENSION, ESSENTIAL: ICD-10-CM

## 2021-03-29 DIAGNOSIS — E78.5 HYPERLIPIDEMIA, UNSPECIFIED HYPERLIPIDEMIA TYPE: ICD-10-CM

## 2021-03-29 DIAGNOSIS — E11.65 TYPE II DIABETES MELLITUS WITH HYPEROSMOLARITY, UNCONTROLLED (HCC): ICD-10-CM

## 2021-03-29 DIAGNOSIS — E11.00 TYPE II DIABETES MELLITUS WITH HYPEROSMOLARITY, UNCONTROLLED (HCC): ICD-10-CM

## 2021-03-29 LAB
25(OH)D3 SERPL-MCNC: 59.1 NG/ML (ref 30–100)
ALBUMIN SERPL BCP-MCNC: 4.1 G/DL (ref 3.4–4.8)
ALP SERPL-CCNC: 82.4 U/L (ref 10–129)
ALT SERPL W P-5'-P-CCNC: 14 U/L (ref 5–63)
ANION GAP SERPL CALCULATED.3IONS-SCNC: 5 MMOL/L (ref 4–13)
AST SERPL W P-5'-P-CCNC: 13 U/L (ref 15–41)
BACTERIA UR QL AUTO: NORMAL /HPF
BILIRUB SERPL-MCNC: 0.65 MG/DL (ref 0.3–1.2)
BILIRUB UR QL STRIP: NEGATIVE
BUN SERPL-MCNC: 22 MG/DL (ref 6–20)
CALCIUM SERPL-MCNC: 9.5 MG/DL (ref 8.4–10.2)
CHLORIDE SERPL-SCNC: 105 MMOL/L (ref 96–108)
CHOLEST SERPL-MCNC: 177 MG/DL
CLARITY UR: CLEAR
CO2 SERPL-SCNC: 28 MMOL/L (ref 22–33)
COLOR UR: YELLOW
CREAT SERPL-MCNC: 1.22 MG/DL (ref 0.5–1.2)
ERYTHROCYTE [DISTWIDTH] IN BLOOD BY AUTOMATED COUNT: 14.6 % (ref 11.6–15.1)
EST. AVERAGE GLUCOSE BLD GHB EST-MCNC: 237 MG/DL
GFR SERPL CREATININE-BSD FRML MDRD: 75 ML/MIN/1.73SQ M
GLUCOSE P FAST SERPL-MCNC: 158 MG/DL (ref 70–105)
GLUCOSE UR STRIP-MCNC: ABNORMAL MG/DL
HBA1C MFR BLD: 9.9 %
HCT VFR BLD AUTO: 45.4 % (ref 36.5–49.3)
HDLC SERPL-MCNC: 54 MG/DL
HGB BLD-MCNC: 14.6 G/DL (ref 12–17)
HGB UR QL STRIP.AUTO: ABNORMAL
KETONES UR STRIP-MCNC: NEGATIVE MG/DL
LDLC SERPL CALC-MCNC: 88 MG/DL (ref 0–100)
LEUKOCYTE ESTERASE UR QL STRIP: NEGATIVE
MCH RBC QN AUTO: 25.3 PG (ref 26.8–34.3)
MCHC RBC AUTO-ENTMCNC: 32.2 G/DL (ref 31.4–37.4)
MCV RBC AUTO: 79 FL (ref 82–98)
NITRITE UR QL STRIP: NEGATIVE
NON-SQ EPI CELLS URNS QL MICRO: NORMAL /HPF
NONHDLC SERPL-MCNC: 123 MG/DL
PH UR STRIP.AUTO: 6 [PH]
PLATELET # BLD AUTO: 286 THOUSANDS/UL (ref 149–390)
PMV BLD AUTO: 9.7 FL (ref 8.9–12.7)
POTASSIUM SERPL-SCNC: 4.2 MMOL/L (ref 3.5–5)
PROT SERPL-MCNC: 7.6 G/DL (ref 6.4–8.3)
PROT UR STRIP-MCNC: ABNORMAL MG/DL
RBC # BLD AUTO: 5.76 MILLION/UL (ref 3.88–5.62)
RBC #/AREA URNS AUTO: NORMAL /HPF
SODIUM SERPL-SCNC: 138 MMOL/L (ref 133–145)
SP GR UR STRIP.AUTO: 1.02 (ref 1–1.03)
TRIGL SERPL-MCNC: 173.5 MG/DL
UROBILINOGEN UR QL STRIP.AUTO: 0.2 E.U./DL
WBC # BLD AUTO: 5.62 THOUSAND/UL (ref 4.31–10.16)
WBC #/AREA URNS AUTO: NORMAL /HPF

## 2021-03-29 PROCEDURE — 81003 URINALYSIS AUTO W/O SCOPE: CPT

## 2021-03-29 PROCEDURE — 81001 URINALYSIS AUTO W/SCOPE: CPT

## 2021-03-29 PROCEDURE — 83036 HEMOGLOBIN GLYCOSYLATED A1C: CPT

## 2021-03-29 PROCEDURE — 80053 COMPREHEN METABOLIC PANEL: CPT

## 2021-03-29 PROCEDURE — 36415 COLL VENOUS BLD VENIPUNCTURE: CPT

## 2021-03-29 PROCEDURE — 85027 COMPLETE CBC AUTOMATED: CPT

## 2021-03-29 PROCEDURE — 80061 LIPID PANEL: CPT

## 2021-03-29 PROCEDURE — 82306 VITAMIN D 25 HYDROXY: CPT

## 2021-04-29 ENCOUNTER — APPOINTMENT (OUTPATIENT)
Dept: LAB | Facility: HOSPITAL | Age: 61
End: 2021-04-29
Payer: COMMERCIAL

## 2021-04-29 ENCOUNTER — TRANSCRIBE ORDERS (OUTPATIENT)
Dept: ADMINISTRATIVE | Facility: HOSPITAL | Age: 61
End: 2021-04-29

## 2021-04-29 DIAGNOSIS — R31.21 ASYMPTOMATIC MICROSCOPIC HEMATURIA: Primary | ICD-10-CM

## 2021-04-29 LAB
BACTERIA UR QL AUTO: ABNORMAL /HPF
BILIRUB UR QL STRIP: NEGATIVE
CLARITY UR: CLEAR
COLOR UR: ABNORMAL
GLUCOSE UR STRIP-MCNC: ABNORMAL MG/DL
HGB UR QL STRIP.AUTO: ABNORMAL
HYALINE CASTS #/AREA URNS LPF: ABNORMAL /LPF
KETONES UR STRIP-MCNC: NEGATIVE MG/DL
LEUKOCYTE ESTERASE UR QL STRIP: NEGATIVE
NITRITE UR QL STRIP: NEGATIVE
NON-SQ EPI CELLS URNS QL MICRO: ABNORMAL /HPF
PH UR STRIP.AUTO: 6 [PH]
PROT UR STRIP-MCNC: ABNORMAL MG/DL
RBC #/AREA URNS AUTO: ABNORMAL /HPF
SP GR UR STRIP.AUTO: 1.02 (ref 1–1.03)
UROBILINOGEN UR QL STRIP.AUTO: 0.2 E.U./DL
WBC #/AREA URNS AUTO: ABNORMAL /HPF

## 2021-04-29 PROCEDURE — 81003 URINALYSIS AUTO W/O SCOPE: CPT

## 2021-04-29 PROCEDURE — 81001 URINALYSIS AUTO W/SCOPE: CPT

## 2021-06-09 ENCOUNTER — TRANSCRIBE ORDERS (OUTPATIENT)
Dept: ADMINISTRATIVE | Facility: HOSPITAL | Age: 61
End: 2021-06-09

## 2021-06-09 ENCOUNTER — APPOINTMENT (OUTPATIENT)
Dept: LAB | Facility: HOSPITAL | Age: 61
End: 2021-06-09
Payer: COMMERCIAL

## 2021-06-09 DIAGNOSIS — G89.3 NEOPLASM RELATED PAIN: ICD-10-CM

## 2021-06-09 DIAGNOSIS — C18.9 MALIGNANT NEOPLASM OF COLON, UNSPECIFIED PART OF COLON (HCC): Primary | ICD-10-CM

## 2021-06-09 DIAGNOSIS — C18.9 MALIGNANT NEOPLASM OF COLON, UNSPECIFIED PART OF COLON (HCC): ICD-10-CM

## 2021-06-09 LAB
ALBUMIN SERPL BCP-MCNC: 3.9 G/DL (ref 3.4–4.8)
ALP SERPL-CCNC: 65.2 U/L (ref 10–129)
ALT SERPL W P-5'-P-CCNC: 12 U/L (ref 5–63)
ANION GAP SERPL CALCULATED.3IONS-SCNC: 7 MMOL/L (ref 4–13)
AST SERPL W P-5'-P-CCNC: 16 U/L (ref 15–41)
BASOPHILS # BLD AUTO: 0.07 THOUSANDS/ΜL (ref 0–0.1)
BASOPHILS NFR BLD AUTO: 1 % (ref 0–1)
BILIRUB SERPL-MCNC: 0.7 MG/DL (ref 0.3–1.2)
BUN SERPL-MCNC: 17 MG/DL (ref 6–20)
CALCIUM SERPL-MCNC: 9.4 MG/DL (ref 8.4–10.2)
CEA SERPL-MCNC: 0.7 NG/ML (ref 0–3)
CHLORIDE SERPL-SCNC: 109 MMOL/L (ref 96–108)
CO2 SERPL-SCNC: 25 MMOL/L (ref 22–33)
CREAT SERPL-MCNC: 1.14 MG/DL (ref 0.5–1.2)
EOSINOPHIL # BLD AUTO: 0.34 THOUSAND/ΜL (ref 0–0.61)
EOSINOPHIL NFR BLD AUTO: 6 % (ref 0–6)
ERYTHROCYTE [DISTWIDTH] IN BLOOD BY AUTOMATED COUNT: 15 % (ref 11.6–15.1)
GFR SERPL CREATININE-BSD FRML MDRD: 81 ML/MIN/1.73SQ M
GLUCOSE P FAST SERPL-MCNC: 106 MG/DL (ref 70–105)
HCT VFR BLD AUTO: 45.2 % (ref 36.5–49.3)
HGB BLD-MCNC: 14.9 G/DL (ref 12–17)
IMM GRANULOCYTES # BLD AUTO: 0.01 THOUSAND/UL (ref 0–0.2)
IMM GRANULOCYTES NFR BLD AUTO: 0 % (ref 0–2)
LYMPHOCYTES # BLD AUTO: 1.48 THOUSANDS/ΜL (ref 0.6–4.47)
LYMPHOCYTES NFR BLD AUTO: 25 % (ref 14–44)
MCH RBC QN AUTO: 25.8 PG (ref 26.8–34.3)
MCHC RBC AUTO-ENTMCNC: 33 G/DL (ref 31.4–37.4)
MCV RBC AUTO: 78 FL (ref 82–98)
MONOCYTES # BLD AUTO: 0.65 THOUSAND/ΜL (ref 0.17–1.22)
MONOCYTES NFR BLD AUTO: 11 % (ref 4–12)
NEUTROPHILS # BLD AUTO: 3.42 THOUSANDS/ΜL (ref 1.85–7.62)
NEUTS SEG NFR BLD AUTO: 57 % (ref 43–75)
PLATELET # BLD AUTO: 289 THOUSANDS/UL (ref 149–390)
PMV BLD AUTO: 9.2 FL (ref 8.9–12.7)
POTASSIUM SERPL-SCNC: 4.3 MMOL/L (ref 3.5–5)
PROT SERPL-MCNC: 7.4 G/DL (ref 6.4–8.3)
RBC # BLD AUTO: 5.77 MILLION/UL (ref 3.88–5.62)
SODIUM SERPL-SCNC: 141 MMOL/L (ref 133–145)
WBC # BLD AUTO: 5.97 THOUSAND/UL (ref 4.31–10.16)

## 2021-06-09 PROCEDURE — 86301 IMMUNOASSAY TUMOR CA 19-9: CPT

## 2021-06-09 PROCEDURE — 80053 COMPREHEN METABOLIC PANEL: CPT

## 2021-06-09 PROCEDURE — 36415 COLL VENOUS BLD VENIPUNCTURE: CPT

## 2021-06-09 PROCEDURE — 82378 CARCINOEMBRYONIC ANTIGEN: CPT

## 2021-06-09 PROCEDURE — 85025 COMPLETE CBC W/AUTO DIFF WBC: CPT

## 2021-06-10 LAB — CANCER AG19-9 SERPL-ACNC: 12 U/ML (ref 0–35)

## 2021-09-22 ENCOUNTER — APPOINTMENT (OUTPATIENT)
Dept: LAB | Facility: HOSPITAL | Age: 61
End: 2021-09-22
Payer: COMMERCIAL

## 2021-09-22 DIAGNOSIS — I10 HYPERTENSION, ESSENTIAL: ICD-10-CM

## 2021-09-22 DIAGNOSIS — E78.5 HYPERLIPIDEMIA, UNSPECIFIED HYPERLIPIDEMIA TYPE: ICD-10-CM

## 2021-09-22 DIAGNOSIS — E11.9 DIABETES MELLITUS WITHOUT COMPLICATION (HCC): ICD-10-CM

## 2021-09-22 DIAGNOSIS — E55.9 VITAMIN D DEFICIENCY: ICD-10-CM

## 2021-09-22 DIAGNOSIS — N40.0 BENIGN PROSTATIC HYPERPLASIA, UNSPECIFIED WHETHER LOWER URINARY TRACT SYMPTOMS PRESENT: ICD-10-CM

## 2021-09-22 LAB
25(OH)D3 SERPL-MCNC: 33.5 NG/ML (ref 30–100)
ALBUMIN SERPL BCP-MCNC: 4.1 G/DL (ref 3.4–4.8)
ALP SERPL-CCNC: 76.7 U/L (ref 10–129)
ALT SERPL W P-5'-P-CCNC: 15 U/L (ref 5–63)
ANION GAP SERPL CALCULATED.3IONS-SCNC: 7 MMOL/L (ref 4–13)
AST SERPL W P-5'-P-CCNC: 15 U/L (ref 15–41)
BILIRUB SERPL-MCNC: 0.75 MG/DL (ref 0.3–1.2)
BUN SERPL-MCNC: 23 MG/DL (ref 6–20)
CALCIUM SERPL-MCNC: 9.7 MG/DL (ref 8.4–10.2)
CHLORIDE SERPL-SCNC: 107 MMOL/L (ref 96–108)
CHOLEST SERPL-MCNC: 170 MG/DL
CO2 SERPL-SCNC: 27 MMOL/L (ref 22–33)
CREAT SERPL-MCNC: 1.44 MG/DL (ref 0.5–1.2)
ERYTHROCYTE [DISTWIDTH] IN BLOOD BY AUTOMATED COUNT: 15.4 % (ref 11.6–15.1)
EST. AVERAGE GLUCOSE BLD GHB EST-MCNC: 143 MG/DL
GFR SERPL CREATININE-BSD FRML MDRD: 61 ML/MIN/1.73SQ M
GLUCOSE P FAST SERPL-MCNC: 100 MG/DL (ref 70–105)
HBA1C MFR BLD: 6.6 %
HCT VFR BLD AUTO: 47.2 % (ref 36.5–49.3)
HDLC SERPL-MCNC: 58 MG/DL
HGB BLD-MCNC: 15.2 G/DL (ref 12–17)
LDLC SERPL CALC-MCNC: 77 MG/DL (ref 0–100)
MCH RBC QN AUTO: 25.5 PG (ref 26.8–34.3)
MCHC RBC AUTO-ENTMCNC: 32.2 G/DL (ref 31.4–37.4)
MCV RBC AUTO: 79 FL (ref 82–98)
NONHDLC SERPL-MCNC: 112 MG/DL
PLATELET # BLD AUTO: 313 THOUSANDS/UL (ref 149–390)
PMV BLD AUTO: 9.3 FL (ref 8.9–12.7)
POTASSIUM SERPL-SCNC: 4.5 MMOL/L (ref 3.5–5)
PROT SERPL-MCNC: 7.5 G/DL (ref 6.4–8.3)
RBC # BLD AUTO: 5.96 MILLION/UL (ref 3.88–5.62)
SODIUM SERPL-SCNC: 141 MMOL/L (ref 133–145)
TRIGL SERPL-MCNC: 177.4 MG/DL
WBC # BLD AUTO: 6.33 THOUSAND/UL (ref 4.31–10.16)

## 2021-09-22 PROCEDURE — 80061 LIPID PANEL: CPT

## 2021-09-22 PROCEDURE — 36415 COLL VENOUS BLD VENIPUNCTURE: CPT

## 2021-09-22 PROCEDURE — 82306 VITAMIN D 25 HYDROXY: CPT

## 2021-09-22 PROCEDURE — 80053 COMPREHEN METABOLIC PANEL: CPT

## 2021-09-22 PROCEDURE — 83036 HEMOGLOBIN GLYCOSYLATED A1C: CPT

## 2021-09-22 PROCEDURE — 85027 COMPLETE CBC AUTOMATED: CPT

## 2022-02-15 ENCOUNTER — HOSPITAL ENCOUNTER (OUTPATIENT)
Dept: RADIOLOGY | Facility: HOSPITAL | Age: 62
Discharge: HOME | End: 2022-02-15
Payer: COMMERCIAL

## 2022-02-15 DIAGNOSIS — M25.562 ACUTE PAIN OF LEFT KNEE: ICD-10-CM

## 2022-02-15 PROCEDURE — 73562 X-RAY EXAM OF KNEE 3: CPT

## 2023-04-05 ENCOUNTER — HOSPITAL ENCOUNTER (EMERGENCY)
Facility: HOSPITAL | Age: 63
Discharge: HOME | End: 2023-04-06
Attending: EMERGENCY MEDICINE

## 2023-04-05 DIAGNOSIS — H33.22 LEFT RETINAL DETACHMENT: Primary | ICD-10-CM

## 2023-04-06 VITALS
TEMPERATURE: 98.1 F | SYSTOLIC BLOOD PRESSURE: 158 MMHG | RESPIRATION RATE: 18 BRPM | DIASTOLIC BLOOD PRESSURE: 93 MMHG | OXYGEN SATURATION: 95 % | HEART RATE: 94 BPM

## 2023-04-06 LAB
ANION GAP SERPL CALCULATED.3IONS-SCNC: 7 MMOL/L (ref 4–13)
APTT PPP: 30 SECONDS (ref 23–37)
BUN SERPL-MCNC: 27 MG/DL (ref 5–25)
CALCIUM SERPL-MCNC: 9.3 MG/DL (ref 8.4–10.2)
CARDIAC TROPONIN I PNL SERPL HS: 6 NG/L
CHLORIDE SERPL-SCNC: 108 MMOL/L (ref 96–108)
CO2 SERPL-SCNC: 24 MMOL/L (ref 21–32)
CREAT SERPL-MCNC: 1.74 MG/DL (ref 0.6–1.3)
ERYTHROCYTE [DISTWIDTH] IN BLOOD BY AUTOMATED COUNT: 14.5 % (ref 11.6–15.1)
GFR SERPL CREATININE-BSD FRML MDRD: 41 ML/MIN/1.73SQ M
GLUCOSE SERPL-MCNC: 89 MG/DL (ref 65–140)
HCT VFR BLD AUTO: 41.4 % (ref 36.5–49.3)
HGB BLD-MCNC: 12.9 G/DL (ref 12–17)
INR PPP: 1.22 (ref 0.84–1.19)
MCH RBC QN AUTO: 25.7 PG (ref 26.8–34.3)
MCHC RBC AUTO-ENTMCNC: 31.2 G/DL (ref 31.4–37.4)
MCV RBC AUTO: 83 FL (ref 82–98)
PLATELET # BLD AUTO: 261 THOUSANDS/UL (ref 149–390)
PMV BLD AUTO: 9.1 FL (ref 8.9–12.7)
POTASSIUM SERPL-SCNC: 4.5 MMOL/L (ref 3.5–5.3)
PROTHROMBIN TIME: 15.6 SECONDS (ref 11.6–14.5)
RBC # BLD AUTO: 5.02 MILLION/UL (ref 3.88–5.62)
SODIUM SERPL-SCNC: 139 MMOL/L (ref 135–147)
WBC # BLD AUTO: 7.71 THOUSAND/UL (ref 4.31–10.16)

## 2023-04-06 NOTE — ED PROVIDER NOTES
"History  Chief Complaint   Patient presents with   • Blurred Vision     Pt reports blurred vision in left eye  Pt states it feels like an eyelash is hanging into eye starting today  Ana Maria Gong is a 63-year-old male with significant PMH of T2DM, HLD, PE who presented to the ED today with sudden onset blurriness and seeing \"floaters\" this afternoon  Patient was resting at home when he suddenly had blurred vision in left eye and described seeing something floating in vision, but denied ever going blind and he also has no pain, irritation, or foreign body sensations  He has no identifiable precipitating event  Patient usually uses reading glasses  Patient denied fever, chills, constipation, diarrhea, cough, neurological deficits  Patient endorsed some nasal congestion, rhinorrhea, and sneezing episodes  Prior to Admission Medications   Prescriptions Last Dose Informant Patient Reported? Taking? Empagliflozin (JARDIANCE) 10 MG TABS   Yes No   Sig: Jardiance 10 mg tablet   take 1 tablet by mouth daily   Insulin Pen Needle (BD PEN NEEDLE JAYY 2ND GEN) 32G X 4 MM MISC   Yes No   Sig: BD Jayy 2nd Gen Pen Needle 32 gauge x 5/32\"   USE 1 NEEDLE ONCE DAILY   Lancets (FREESTYLE) lancets   Yes No   Sig: FreeStyle Lancets 28 gauge   TEST 2-3 TIMES A DAY   Naltrexone-buPROPion HCl ER (CONTRAVE) 8-90 MG TB12   Yes No   Sig: Contrave 8 mg-90 mg tablet,extended release   Take 1 tab qd x 1 wk, then 1 tab bid x 1 wk, then 2 tab am and 1 tab pm x 1wk, then 2 tablet(s) 2 TIMES A DAY by oral route  SODIUM FLUORIDE, DENTAL GEL, (PREVIDENT) 1 1 % GEL   Yes No   Sig: PreviDent 5000 Booster Plus 1 1 % dental paste   TRULICITY 3 19 EJ/3 4DK SOPN   Yes No   Si 5 mL once a week   apixaban (ELIQUIS) 5 mg   No No   Sig: Take 2 tablets (10 mg total) by mouth 2 (two) times a day for 7 days, THEN 1 tablet (5 mg total) 2 (two) times a day for 23 days     atorvastatin (LIPITOR) 10 mg tablet   Yes No   Sig: Take 10 mg by " "mouth daily   glucose blood test strip   Yes No   Sig: FreeStyle Lite Meter kit   TEST 2 -3 TIMES A DAY   insulin aspart (NOVOLOG FLEXPEN) 100 Units/mL injection pen   No No   Sig: Inject 10 Units under the skin 3 (three) times a day with meals   insulin glargine (TOUJEO SOLOSTAR) 300 units/mL CONCETRATED U-300 injection pen (1-unit dial)   Yes No   Sig: Toujeo SoloStar U-300 Insulin 300 unit/mL (1 5 mL) subcutaneous pen   inject 10 units subcutaneously at bedtime   insulin lispro (HumaLOG) 100 units/mL injection pen   Yes No   measles-mumps-rubella (M-M-R II)   Yes No   Sig: M-M-R II (PF) 1,000-12,500 TCID50/0 5 mL subcutaneous solution   TO BE ADMINISTERED BY PHARMACIST FOR IMMUNIZATION   metFORMIN (GLUCOPHAGE) 1000 MG tablet   Yes No   Sig: Take 1,000 mg by mouth daily      Facility-Administered Medications: None       Past Medical History:   Diagnosis Date   • Diabetes mellitus ([facility] [address] )    • Hyperlipidemia    • Renal disorder        Past Surgical History:   Procedure Laterality Date   • COLECTOMY  2019    [facility]   • COLONOSCOPY  10/2019   • HERNIA REPAIR Left 2019    [facility]   • POLYPECTOMY         Family History   Problem Relation Age of Onset   • Hypertension Mother    • Cancer Father    • Alcohol abuse Father    • No Known Problems Son    • No Known Problems Daughter      I have reviewed and agree with the history as documented      E-Cigarette/Vaping   • E-Cigarette Use Never User      E-Cigarette/Vaping Substances     Social History     Tobacco Use   • Smoking status: Former     Packs/day: 0 20     Years: 10 00     Pack years: 2 00     Types: Cigarettes     Quit date:      Years since quittin 2   • Smokeless tobacco: Never   Vaping Use   • Vaping Use: Never used   Substance Use Topics   • Alcohol use: Not Currently     Comment: soc   • Drug use: Not Currently     Types: \"Crack\" cocaine, Marijuana        Review of Systems   See above    Physical Exam  ED Triage Vitals [23] " Temperature Pulse Respirations Blood Pressure SpO2   98 1 °F (36 7 °C) (!) 118 20 121/80 98 %      Temp Source Heart Rate Source Patient Position - Orthostatic VS BP Location FiO2 (%)   Oral Monitor Sitting Right arm --      Pain Score       --             Orthostatic Vital Signs  Vitals:    04/05/23 2142 04/06/23 0103 04/06/23 0115   BP: 121/80 158/93 158/93   Pulse: (!) 118 92 94   Patient Position - Orthostatic VS: Sitting Lying Lying       Physical Exam  Constitutional:       General: He is not in acute distress  Appearance: Normal appearance  He is not ill-appearing  HENT:      Head: Normocephalic and atraumatic  Right Ear: Tympanic membrane, ear canal and external ear normal  There is impacted cerumen  Left Ear: Tympanic membrane, ear canal and external ear normal       Nose: Congestion present  Comments: Enlarged inferior turbinates visualized bilaterally     Mouth/Throat:      Mouth: Mucous membranes are moist       Pharynx: Oropharynx is clear  Eyes:      General: No scleral icterus  Right eye: No discharge  Left eye: No discharge  Extraocular Movements: Extraocular movements intact  Conjunctiva/sclera: Conjunctivae normal       Pupils: Pupils are equal, round, and reactive to light  Comments: Blurred vision in left eye, normal red color identification bilaterally   Cardiovascular:      Rate and Rhythm: Normal rate and regular rhythm  Pulses: Normal pulses  Heart sounds: Normal heart sounds  Pulmonary:      Effort: Pulmonary effort is normal       Breath sounds: Normal breath sounds  Abdominal:      General: Abdomen is flat  Bowel sounds are normal       Palpations: Abdomen is soft  Musculoskeletal:      Cervical back: Normal range of motion and neck supple  Skin:     General: Skin is warm and dry  Capillary Refill: Capillary refill takes less than 2 seconds  Neurological:      General: No focal deficit present        Mental Status: He is alert and oriented to person, place, and time  Psychiatric:         Mood and Affect: Mood normal          Behavior: Behavior normal          Thought Content:  Thought content normal          Judgment: Judgment normal          ED Medications  Medications - No data to display    Diagnostic Studies  Results Reviewed     Procedure Component Value Units Date/Time    HS Troponin I 2hr [[id number]]     Lab Status: No result Specimen: Blood     HS Troponin 0hr (reflex protocol) [[id number]]  (Normal) Collected: 04/06/23 0135    Lab Status: Final result Specimen: Blood from Arm, Right Updated: 04/06/23 0212     hs TnI 0hr 6 ng/L     Protime-INR [[id number]]  (Abnormal) Collected: 04/06/23 0135    Lab Status: Final result Specimen: Blood from Arm, Right Updated: 04/06/23 0210     Protime 15 6 seconds      INR 1 22    APTT [[id number]]  (Normal) Collected: 04/06/23 0135    Lab Status: Final result Specimen: Blood from Arm, Right Updated: 04/06/23 0210     PTT 30 seconds     Basic metabolic panel [[id number]]  (Abnormal) Collected: 04/06/23 0135    Lab Status: Final result Specimen: Blood from Arm, Right Updated: 04/06/23 0206     Sodium 139 mmol/L      Potassium 4 5 mmol/L      Chloride 108 mmol/L      CO2 24 mmol/L      ANION GAP 7 mmol/L      BUN 27 mg/dL      Creatinine 1 74 mg/dL      Glucose 89 mg/dL      Calcium 9 3 mg/dL      eGFR 41 ml/min/1 73sq m     Narrative:      [facility] guidelines for Chronic Kid[facility] Disease (CKD):   •  Stage 1 with normal or high GFR (GFR > 90 mL/min/1 73 square meters)  •  Stage 2 Mild CKD (GFR = 60-89 mL/min/1 73 square meters)  •  Stage 3A Moderate CKD (GFR = 45-59 mL/min/1 73 square meters)  •  Stage 3B Moderate CKD (GFR = 30-44 mL/min/1 73 square meters)  •  Stage 4 Severe CKD (GFR = 15-29 mL/min/1 73 square meters)  •  Stage 5 End Stage CKD (GFR <15 mL/min/1 73 square meters)  Note: GFR calculation is accurate only with a steady state creatinine    CBC "and Platelet [[id number]]  (Abnormal) Collected: 04/06/23 0135    Lab Status: Final result Specimen: Blood from Arm, Right Updated: 04/06/23 0147     WBC 7 71 Thousand/uL      RBC 5 02 Million/uL      Hemoglobin 12 9 g/dL      Hematocrit 41 4 %      MCV 83 fL      MCH 25 7 pg      MCHC 31 2 g/dL      RDW 14 5 %      Platelets 654 Thousands/uL      MPV 9 1 fL                  No orders to display         Procedures  Procedures      ED Course                                       Medical Decision Making  Patient is a 42-year-old male with significant PMH of T2DM, HLD, PE who presented to ED today with 6 hours history of sudden onset blurriness in left eye vision and seeing \"floaters  \"    Differential diagnoses include stroke, amaurosis fugax, retinal detachment, acute maculopathy  Given patient's past history of PE, patient is at higher risk for vascular occlusion, however, patient has never had complete or partial visual field loss but simply blurriness of acuity  Less likely to be infectious causes since patient has no pain, irritation, or foreign body sensations  Patient has high risk for retinal detachment given direct visualization of likely retinal detachment on ocular ultrasound and dark retinal field on funduscopic exam   Patient has IOP of right 23, left 18  Findings discussed with Dr Judge Brannon, ophthalmologist, who agrees with high suspicion for retinal detachment and informed patient should call his clinic first thing in the morning (4/6/2023 8 AM) to schedule an appointment and be seen right away for further evaluation  Patient agreed to plan  Amount and/or Complexity of Data Reviewed  Labs: ordered       Details: CBC, BMP, troponin, APTT, INR            Disposition  Final diagnoses:   Left retinal detachment     Time reflects when diagnosis was documented in both MDM as applicable and the Disposition within this note     Time User Action Codes Description Comment    4/6/2023  2:09 AM Renetta Juarez" Eather Arn [H33 20] Retinal detachment     4/6/2023  2:09 AM Derryl Locket Remove [H33 20] Retinal detachment     4/6/2023  2:09 AM Derryl Locket Add [H33 22] Left retinal detachment       ED Disposition     ED Disposition   Discharge    Condition   Stable    Date/Time   Thu Apr 6, 2023  2:09 AM    Comment   Reida Dakin discharge to home/self care  Follow-up Information     Follow up With Specialties Details Why Contact Info Additional Information    Erick Wagner MD Ophthalmology Call today call clinic today to be seen first thing in morning (4/6/23) for retinal detachment [address]  [id number] [facility] [phone]       [facility] Emergency Department Emergency Medicine  As needed, If symptoms worsen [address] Emergency Department, [address], [place], [place], [address]          Patient's Medications   Discharge Prescriptions    No medications on file     No discharge procedures on file  PDMP Review     None           ED Provider  Attending physically available and evaluated Reida Dakin I managed the patient along with the ED Attending      Electronically Signed by         Guerrero Pyle MD  04/06/23 1555

## 2023-04-06 NOTE — ED ATTENDING ATTESTATION
4/5/2023  INaomi MD, saw and evaluated the patient  I have discussed the patient with the resident/non-physician practitioner and agree with the resident's/non-physician practitioner's findings, Plan of Care, and MDM as documented in the resident's/non-physician practitioner's note, except where noted  All available labs and Radiology studies were reviewed  I was present for key portions of any procedure(s) performed by the resident/non-physician practitioner and I was immediately available to provide assistance  At this point I agree with the current assessment done in the Emergency Department  I have conducted an independent evaluation of this patient a history and physical is as follows: This is a 63 y o  old male who presents to the ED for evaluation of vision loss  L eye suddenly seeing floaters and VA decreased to only shapes and shadows  No other neuro deficits  Has DM, HLD  No trauma, no pain  VS and nursing notes reviewed  General: Appears in NAD, awake, alert, speaking normally in full sentences  Well-nourished, well-developed  Appears stated age  Head: Normocephalic, atraumatic  Eyes: EOMI  Vision grossly normal  No subconjunctival hemorrhages or occular discharge noted  Symmetrical lids  Fundoscopic exam on the L shows dark retina, difficult to identify structures  ENT: Atraumatic external nose and ears  No stridor  Normal phonation  No drooling  Normal swallowing  Neck: No JVD  FROM  No goiter  CV: No pallor  Normal rate  Lungs: No tachypnea  No respiratory distress  MSK: Moving all extremities equally, no peripheral edema  Skin: Dry, intact  No cyanosis  Neuro: Awake, alert, GCS15  CN II-XII grossly intact  Grossly normal gait  Psychiatric/Behavioral: Appropriate mood and affect  A/P: This is a 63 y o  male who presents to the ED for evaluation of dec VA  Can only see shadows in the L eye  Some floaters noted   Ocular US shows retinal detachment, I do not suspect this is a stroke as he has NO other neuro findings  US is compelling  We will d/w ophthalmology to determine the appropriate disposition for this patient  ED Course       Critical Care Time  POC Ocular US    Date/Time: 4/6/2023 1:40 AM  Performed by: Aiden Lopez MD  Authorized by: Aiden Lopez MD     Patient location:  ED  Performed by:   Attending  Procedure details:     Exam Type:  Diagnostic    Indications: visual change      Assessment for: retinal detachment      Eye(s) assessed:  Left eye    Structures visualized: posterior chamber      Image quality: diagnostic      Image availability:  Images available in PACS  Left eye findings:     Vitreous body comment:  Retinal detachment  Interpretation:     Ocular US impressions: abnormal (see above)      Ocular US impressions comment:  Retinal detachment